# Patient Record
Sex: MALE | Race: WHITE | NOT HISPANIC OR LATINO | URBAN - METROPOLITAN AREA
[De-identification: names, ages, dates, MRNs, and addresses within clinical notes are randomized per-mention and may not be internally consistent; named-entity substitution may affect disease eponyms.]

---

## 2021-07-26 ENCOUNTER — HISTORICAL (OUTPATIENT)
Dept: ADMINISTRATIVE | Facility: HOSPITAL | Age: 41
End: 2021-07-26

## 2024-05-06 ENCOUNTER — HOSPITAL ENCOUNTER (OUTPATIENT)
Facility: HOSPITAL | Age: 44
Discharge: HOME | End: 2024-05-06
Attending: INTERNAL MEDICINE | Admitting: INTERNAL MEDICINE
Payer: MEDICARE

## 2024-05-06 DIAGNOSIS — I87.2 PERIPHERAL VENOUS INSUFFICIENCY: ICD-10-CM

## 2024-05-06 DIAGNOSIS — R07.9 CHEST PAIN: ICD-10-CM

## 2024-05-06 DIAGNOSIS — I87.2 VENOUS INSUFFICIENCY: ICD-10-CM

## 2024-05-06 LAB
OHS QRS DURATION: 112 MS
OHS QTC CALCULATION: 431 MS

## 2024-05-06 PROCEDURE — C1725 CATH, TRANSLUMIN NON-LASER: HCPCS | Performed by: INTERNAL MEDICINE

## 2024-05-06 PROCEDURE — C1887 CATHETER, GUIDING: HCPCS | Performed by: INTERNAL MEDICINE

## 2024-05-06 PROCEDURE — 93005 ELECTROCARDIOGRAM TRACING: CPT | Mod: 59

## 2024-05-06 PROCEDURE — C1753 CATH, INTRAVAS ULTRASOUND: HCPCS | Performed by: INTERNAL MEDICINE

## 2024-05-06 PROCEDURE — 75822 VEIN X-RAY ARMS/LEGS: CPT | Mod: 59 | Performed by: INTERNAL MEDICINE

## 2024-05-06 PROCEDURE — 37252 INTRVASC US NONCORONARY 1ST: CPT | Performed by: INTERNAL MEDICINE

## 2024-05-06 PROCEDURE — 99153 MOD SED SAME PHYS/QHP EA: CPT | Performed by: INTERNAL MEDICINE

## 2024-05-06 PROCEDURE — 37253 INTRVASC US NONCORONARY ADDL: CPT | Performed by: INTERNAL MEDICINE

## 2024-05-06 PROCEDURE — 25000003 PHARM REV CODE 250: Performed by: INTERNAL MEDICINE

## 2024-05-06 PROCEDURE — 63600175 PHARM REV CODE 636 W HCPCS: Performed by: INTERNAL MEDICINE

## 2024-05-06 PROCEDURE — C1760 CLOSURE DEV, VASC: HCPCS | Performed by: INTERNAL MEDICINE

## 2024-05-06 PROCEDURE — 37238 OPEN/PERQ PLACE STENT SAME: CPT | Mod: LT | Performed by: INTERNAL MEDICINE

## 2024-05-06 PROCEDURE — 93010 ELECTROCARDIOGRAM REPORT: CPT | Mod: ,,, | Performed by: INTERNAL MEDICINE

## 2024-05-06 PROCEDURE — 99152 MOD SED SAME PHYS/QHP 5/>YRS: CPT | Performed by: INTERNAL MEDICINE

## 2024-05-06 PROCEDURE — C1769 GUIDE WIRE: HCPCS | Performed by: INTERNAL MEDICINE

## 2024-05-06 PROCEDURE — 25500020 PHARM REV CODE 255: Performed by: INTERNAL MEDICINE

## 2024-05-06 PROCEDURE — C1894 INTRO/SHEATH, NON-LASER: HCPCS | Performed by: INTERNAL MEDICINE

## 2024-05-06 PROCEDURE — 85347 COAGULATION TIME ACTIVATED: CPT | Performed by: INTERNAL MEDICINE

## 2024-05-06 PROCEDURE — C1876 STENT, NON-COA/NON-COV W/DEL: HCPCS | Performed by: INTERNAL MEDICINE

## 2024-05-06 DEVICE — SYS CLSR VASCADE MVP ID6-12FR: Type: IMPLANTABLE DEVICE | Site: ILIAC CREST | Status: FUNCTIONAL

## 2024-05-06 DEVICE — STENT AB9U18120090 ABRE V01
Type: IMPLANTABLE DEVICE | Site: ILIAC CREST | Status: FUNCTIONAL
Brand: ABRE™

## 2024-05-06 RX ORDER — SODIUM CHLORIDE 9 MG/ML
INJECTION, SOLUTION INTRAVENOUS CONTINUOUS
Status: DISCONTINUED | OUTPATIENT
Start: 2024-05-06 | End: 2024-05-06 | Stop reason: HOSPADM

## 2024-05-06 RX ORDER — SILDENAFIL 50 MG/1
50 TABLET, FILM COATED ORAL DAILY PRN
COMMUNITY

## 2024-05-06 RX ORDER — GABAPENTIN 300 MG/1
300 CAPSULE ORAL 3 TIMES DAILY
COMMUNITY
Start: 2024-04-03

## 2024-05-06 RX ORDER — CHOLECALCIFEROL (VITAMIN D3) 25 MCG
1 TABLET ORAL DAILY
COMMUNITY

## 2024-05-06 RX ORDER — CLONAZEPAM 1 MG/1
1 TABLET ORAL DAILY PRN
COMMUNITY
Start: 2024-02-16

## 2024-05-06 RX ORDER — LORATADINE 10 MG/1
10 TABLET ORAL DAILY
COMMUNITY

## 2024-05-06 RX ORDER — DIPHENHYDRAMINE HCL 25 MG
50 CAPSULE ORAL
Status: DISPENSED | OUTPATIENT
Start: 2024-05-06

## 2024-05-06 RX ORDER — ONDANSETRON 4 MG/1
8 TABLET, ORALLY DISINTEGRATING ORAL EVERY 8 HOURS PRN
Status: DISCONTINUED | OUTPATIENT
Start: 2024-05-06 | End: 2024-05-06 | Stop reason: HOSPADM

## 2024-05-06 RX ORDER — HYDRALAZINE HYDROCHLORIDE 20 MG/ML
10 INJECTION INTRAMUSCULAR; INTRAVENOUS EVERY 4 HOURS PRN
Status: DISCONTINUED | OUTPATIENT
Start: 2024-05-06 | End: 2024-05-06 | Stop reason: HOSPADM

## 2024-05-06 RX ORDER — FLUTICASONE PROPIONATE AND SALMETEROL 100; 50 UG/1; UG/1
1 POWDER RESPIRATORY (INHALATION) 2 TIMES DAILY
COMMUNITY

## 2024-05-06 RX ORDER — HYDROCHLOROTHIAZIDE 12.5 MG/1
12.5 TABLET ORAL DAILY PRN
COMMUNITY

## 2024-05-06 RX ORDER — ACETAMINOPHEN 325 MG/1
650 TABLET ORAL EVERY 4 HOURS PRN
Status: DISCONTINUED | OUTPATIENT
Start: 2024-05-06 | End: 2024-05-06 | Stop reason: HOSPADM

## 2024-05-06 RX ORDER — DEXTROAMPHETAMINE SACCHARATE, AMPHETAMINE ASPARTATE, DEXTROAMPHETAMINE SULFATE AND AMPHETAMINE SULFATE 2.5; 2.5; 2.5; 2.5 MG/1; MG/1; MG/1; MG/1
1 TABLET ORAL
COMMUNITY
Start: 2024-03-20

## 2024-05-06 RX ORDER — DEXTROAMPHETAMINE SACCHARATE, AMPHETAMINE ASPARTATE MONOHYDRATE, DEXTROAMPHETAMINE SULFATE AND AMPHETAMINE SULFATE 7.5; 7.5; 7.5; 7.5 MG/1; MG/1; MG/1; MG/1
30 CAPSULE, EXTENDED RELEASE ORAL EVERY MORNING
COMMUNITY
Start: 2024-03-20

## 2024-05-06 RX ORDER — LIDOCAINE HYDROCHLORIDE 10 MG/ML
INJECTION INFILTRATION; PERINEURAL
Status: DISCONTINUED | OUTPATIENT
Start: 2024-05-06 | End: 2024-05-06 | Stop reason: HOSPADM

## 2024-05-06 RX ORDER — MORPHINE SULFATE 4 MG/ML
2 INJECTION, SOLUTION INTRAMUSCULAR; INTRAVENOUS EVERY 4 HOURS PRN
Status: DISCONTINUED | OUTPATIENT
Start: 2024-05-06 | End: 2024-05-06 | Stop reason: HOSPADM

## 2024-05-06 RX ORDER — DIAZEPAM 5 MG/1
10 TABLET ORAL
Status: DISPENSED | OUTPATIENT
Start: 2024-05-06

## 2024-05-06 RX ORDER — ASPIRIN 81 MG/1
81 TABLET ORAL DAILY
Qty: 90 TABLET | Refills: 3 | Status: SHIPPED | OUTPATIENT
Start: 2024-05-06 | End: 2025-05-01

## 2024-05-06 RX ORDER — HYDROCODONE BITARTRATE AND ACETAMINOPHEN 5; 325 MG/1; MG/1
1 TABLET ORAL EVERY 4 HOURS PRN
Status: DISCONTINUED | OUTPATIENT
Start: 2024-05-06 | End: 2024-05-06 | Stop reason: HOSPADM

## 2024-05-06 RX ORDER — ASPIRIN 325 MG
TABLET ORAL
Status: DISCONTINUED | OUTPATIENT
Start: 2024-05-06 | End: 2024-05-06 | Stop reason: HOSPADM

## 2024-05-06 RX ORDER — FENTANYL CITRATE 50 UG/ML
INJECTION, SOLUTION INTRAMUSCULAR; INTRAVENOUS
Status: DISCONTINUED | OUTPATIENT
Start: 2024-05-06 | End: 2024-05-06 | Stop reason: HOSPADM

## 2024-05-06 RX ORDER — ALUMINUM HYDROXIDE, MAGNESIUM HYDROXIDE, AND SIMETHICONE 1200; 120; 1200 MG/30ML; MG/30ML; MG/30ML
30 SUSPENSION ORAL ONCE
Status: DISCONTINUED | OUTPATIENT
Start: 2024-05-06 | End: 2024-05-06 | Stop reason: HOSPADM

## 2024-05-06 RX ORDER — MIDAZOLAM HYDROCHLORIDE 1 MG/ML
INJECTION INTRAMUSCULAR; INTRAVENOUS
Status: DISCONTINUED | OUTPATIENT
Start: 2024-05-06 | End: 2024-05-06 | Stop reason: HOSPADM

## 2024-05-06 RX ORDER — LITHIUM CARBONATE 450 MG/1
900 TABLET ORAL NIGHTLY
COMMUNITY

## 2024-05-06 RX ORDER — ALUMINUM HYDROXIDE, MAGNESIUM HYDROXIDE, AND SIMETHICONE 1200; 120; 1200 MG/30ML; MG/30ML; MG/30ML
SUSPENSION ORAL
Status: DISCONTINUED | OUTPATIENT
Start: 2024-05-06 | End: 2024-05-06 | Stop reason: HOSPADM

## 2024-05-06 RX ORDER — PRAZOSIN HYDROCHLORIDE 1 MG/1
2 CAPSULE ORAL NIGHTLY
COMMUNITY

## 2024-05-06 RX ORDER — CLOPIDOGREL BISULFATE 75 MG/1
75 TABLET ORAL DAILY
Qty: 90 TABLET | Refills: 3 | Status: SHIPPED | OUTPATIENT
Start: 2024-05-06 | End: 2025-05-06

## 2024-05-06 RX ORDER — SODIUM CHLORIDE 9 MG/ML
INJECTION, SOLUTION INTRAVENOUS ONCE
Status: COMPLETED | OUTPATIENT
Start: 2024-05-06 | End: 2024-05-06

## 2024-05-06 RX ORDER — HEPARIN SODIUM 1000 [USP'U]/ML
INJECTION, SOLUTION INTRAVENOUS; SUBCUTANEOUS
Status: DISCONTINUED | OUTPATIENT
Start: 2024-05-06 | End: 2024-05-06 | Stop reason: HOSPADM

## 2024-05-06 RX ORDER — CLOPIDOGREL BISULFATE 300 MG/1
TABLET, FILM COATED ORAL
Status: DISCONTINUED | OUTPATIENT
Start: 2024-05-06 | End: 2024-05-06 | Stop reason: HOSPADM

## 2024-05-06 RX ADMIN — DIAZEPAM 10 MG: 5 TABLET ORAL at 09:05

## 2024-05-06 RX ADMIN — DIPHENHYDRAMINE HYDROCHLORIDE 50 MG: 25 CAPSULE ORAL at 09:05

## 2024-05-06 RX ADMIN — SODIUM CHLORIDE: 9 INJECTION, SOLUTION INTRAVENOUS at 09:05

## 2024-05-06 NOTE — Clinical Note
A venogram was performed of the Bilateral Venogram [place]. The vessel was injected via power injection The venogram syringe was removed and the venogram is complete.

## 2024-05-10 VITALS
SYSTOLIC BLOOD PRESSURE: 142 MMHG | WEIGHT: 232.81 LBS | BODY MASS INDEX: 33.33 KG/M2 | HEART RATE: 69 BPM | DIASTOLIC BLOOD PRESSURE: 81 MMHG | TEMPERATURE: 97 F | HEIGHT: 70 IN | OXYGEN SATURATION: 98 %

## 2024-10-11 ENCOUNTER — HOSPITAL ENCOUNTER (INPATIENT)
Facility: HOSPITAL | Age: 44
LOS: 5 days | Discharge: HOME | DRG: 493 | End: 2024-10-17
Attending: STUDENT IN AN ORGANIZED HEALTH CARE EDUCATION/TRAINING PROGRAM | Admitting: SURGERY
Payer: COMMERCIAL

## 2024-10-11 DIAGNOSIS — Q73.8 REDUCTION DEFECT OF EXTREMITY: ICD-10-CM
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
## 2024-10-11 DIAGNOSIS — T14.90XA TRAUMA: ICD-10-CM

## 2024-10-11 DIAGNOSIS — Z01.818 PRE-OP EVALUATION: ICD-10-CM

## 2024-10-11 DIAGNOSIS — S02.2XXA CLOSED FRACTURE OF NASAL BONE, INITIAL ENCOUNTER: ICD-10-CM

## 2024-10-11 DIAGNOSIS — S82.891A CLOSED FRACTURE OF RIGHT ANKLE, INITIAL ENCOUNTER: ICD-10-CM

## 2024-10-11 DIAGNOSIS — S52.502A CLOSED FRACTURE OF DISTAL END OF LEFT RADIUS, UNSPECIFIED FRACTURE MORPHOLOGY, INITIAL ENCOUNTER: Primary | ICD-10-CM

## 2024-10-11 DIAGNOSIS — S01.81XA FACIAL LACERATION, INITIAL ENCOUNTER: ICD-10-CM

## 2024-10-11 DIAGNOSIS — S62.102A CLOSED FRACTURE OF LEFT WRIST, INITIAL ENCOUNTER: ICD-10-CM

## 2024-10-11 LAB
ABORH RETYPE: NORMAL
ALBUMIN SERPL-MCNC: 4 G/DL (ref 3.5–5)
ALBUMIN/GLOB SERPL: 1.6 RATIO (ref 1.1–2)
ALP SERPL-CCNC: 78 UNIT/L (ref 40–150)
ALT SERPL-CCNC: 15 UNIT/L (ref 0–55)
AMPHET UR QL SCN: POSITIVE
ANION GAP SERPL CALC-SCNC: 6 MEQ/L
APTT PPP: 24.1 SECONDS (ref 23.2–33.7)
AST SERPL-CCNC: 18 UNIT/L (ref 5–34)
BACTERIA #/AREA URNS AUTO: ABNORMAL /HPF
BARBITURATE SCN PRESENT UR: NEGATIVE
BASOPHILS # BLD AUTO: 0.06 X10(3)/MCL
BASOPHILS NFR BLD AUTO: 0.4 %
BENZODIAZ UR QL SCN: NEGATIVE
BILIRUB SERPL-MCNC: 0.3 MG/DL
BILIRUB UR QL STRIP.AUTO: NEGATIVE
BUN SERPL-MCNC: 12.5 MG/DL (ref 8.9–20.6)
CALCIUM SERPL-MCNC: 9.6 MG/DL (ref 8.4–10.2)
CANNABINOIDS UR QL SCN: POSITIVE
CHLORIDE SERPL-SCNC: 107 MMOL/L (ref 98–107)
CLARITY UR: CLEAR
CO2 SERPL-SCNC: 24 MMOL/L (ref 22–29)
COCAINE UR QL SCN: NEGATIVE
COLOR UR AUTO: ABNORMAL
CREAT SERPL-MCNC: 1.13 MG/DL (ref 0.73–1.18)
CREAT/UREA NIT SERPL: 11
EOSINOPHIL # BLD AUTO: 0.02 X10(3)/MCL (ref 0–0.9)
EOSINOPHIL NFR BLD AUTO: 0.1 %
ERYTHROCYTE [DISTWIDTH] IN BLOOD BY AUTOMATED COUNT: 13.4 % (ref 11.5–17)
ETHANOL SERPL-MCNC: <10 MG/DL
FENTANYL UR QL SCN: POSITIVE
GFR SERPLBLD CREATININE-BSD FMLA CKD-EPI: >60 ML/MIN/1.73/M2
GLOBULIN SER-MCNC: 2.5 GM/DL (ref 2.4–3.5)
GLUCOSE SERPL-MCNC: 124 MG/DL (ref 74–100)
GLUCOSE UR QL STRIP: NORMAL
GROUP & RH: NORMAL
HCT VFR BLD AUTO: 46.2 % (ref 42–52)
HGB BLD-MCNC: 14.9 G/DL (ref 14–18)
HGB UR QL STRIP: NEGATIVE
IMM GRANULOCYTES # BLD AUTO: 0.22 X10(3)/MCL (ref 0–0.04)
IMM GRANULOCYTES NFR BLD AUTO: 1.3 %
INDIRECT COOMBS: NORMAL
INR PPP: 0.9
KETONES UR QL STRIP: NEGATIVE
LACTATE SERPL-SCNC: 1.9 MMOL/L (ref 0.5–2.2)
LEUKOCYTE ESTERASE UR QL STRIP: NEGATIVE
LYMPHOCYTES # BLD AUTO: 1.32 X10(3)/MCL (ref 0.6–4.6)
LYMPHOCYTES NFR BLD AUTO: 7.7 %
MCH RBC QN AUTO: 28.5 PG (ref 27–31)
MCHC RBC AUTO-ENTMCNC: 32.3 G/DL (ref 33–36)
MCV RBC AUTO: 88.3 FL (ref 80–94)
MDMA UR QL SCN: NEGATIVE
MONOCYTES # BLD AUTO: 0.85 X10(3)/MCL (ref 0.1–1.3)
MONOCYTES NFR BLD AUTO: 5 %
MUCOUS THREADS URNS QL MICRO: ABNORMAL /LPF
NEUTROPHILS # BLD AUTO: 14.66 X10(3)/MCL (ref 2.1–9.2)
NEUTROPHILS NFR BLD AUTO: 85.5 %
NITRITE UR QL STRIP: NEGATIVE
NRBC BLD AUTO-RTO: 0 %
OHS QRS DURATION: 110 MS
OHS QTC CALCULATION: 447 MS
OPIATES UR QL SCN: POSITIVE
PCP UR QL: NEGATIVE
PH UR STRIP: 6 [PH]
PH UR: 6 [PH] (ref 3–11)
PLATELET # BLD AUTO: 342 X10(3)/MCL (ref 130–400)
PMV BLD AUTO: 10.4 FL (ref 7.4–10.4)
POTASSIUM SERPL-SCNC: 4.6 MMOL/L (ref 3.5–5.1)
PROT SERPL-MCNC: 6.5 GM/DL (ref 6.4–8.3)
PROT UR QL STRIP: NEGATIVE
PROTHROMBIN TIME: 12.2 SECONDS (ref 12.5–14.5)
RBC # BLD AUTO: 5.23 X10(6)/MCL (ref 4.7–6.1)
RBC #/AREA URNS AUTO: ABNORMAL /HPF
SODIUM SERPL-SCNC: 137 MMOL/L (ref 136–145)
SP GR UR STRIP.AUTO: 1.04 (ref 1–1.03)
SPECIFIC GRAVITY, URINE AUTO (.000) (OHS): 1.04 (ref 1–1.03)
SPECIMEN OUTDATE: NORMAL
SQUAMOUS #/AREA URNS LPF: ABNORMAL /HPF
UROBILINOGEN UR STRIP-ACNC: NORMAL
WBC # BLD AUTO: 17.13 X10(3)/MCL (ref 4.5–11.5)
WBC #/AREA URNS AUTO: ABNORMAL /HPF

## 2024-10-11 PROCEDURE — 0PSJ35Z REPOSITION LEFT RADIUS WITH EXTERNAL FIXATION DEVICE, PERCUTANEOUS APPROACH: ICD-10-PCS | Performed by: STUDENT IN AN ORGANIZED HEALTH CARE EDUCATION/TRAINING PROGRAM

## 2024-10-11 PROCEDURE — 96372 THER/PROPH/DIAG INJ SC/IM: CPT

## 2024-10-11 PROCEDURE — 63600175 PHARM REV CODE 636 W HCPCS

## 2024-10-11 PROCEDURE — 99152 MOD SED SAME PHYS/QHP 5/>YRS: CPT

## 2024-10-11 PROCEDURE — 96374 THER/PROPH/DIAG INJ IV PUSH: CPT

## 2024-10-11 PROCEDURE — 25000003 PHARM REV CODE 250

## 2024-10-11 PROCEDURE — 86901 BLOOD TYPING SEROLOGIC RH(D): CPT | Performed by: STUDENT IN AN ORGANIZED HEALTH CARE EDUCATION/TRAINING PROGRAM

## 2024-10-11 PROCEDURE — 27000221 HC OXYGEN, UP TO 24 HOURS

## 2024-10-11 PROCEDURE — 82077 ASSAY SPEC XCP UR&BREATH IA: CPT | Performed by: STUDENT IN AN ORGANIZED HEALTH CARE EDUCATION/TRAINING PROGRAM

## 2024-10-11 PROCEDURE — 29105 APPLICATION LONG ARM SPLINT: CPT | Mod: LT

## 2024-10-11 PROCEDURE — 85610 PROTHROMBIN TIME: CPT | Performed by: STUDENT IN AN ORGANIZED HEALTH CARE EDUCATION/TRAINING PROGRAM

## 2024-10-11 PROCEDURE — 83605 ASSAY OF LACTIC ACID: CPT | Performed by: STUDENT IN AN ORGANIZED HEALTH CARE EDUCATION/TRAINING PROGRAM

## 2024-10-11 PROCEDURE — 85025 COMPLETE CBC W/AUTO DIFF WBC: CPT | Performed by: STUDENT IN AN ORGANIZED HEALTH CARE EDUCATION/TRAINING PROGRAM

## 2024-10-11 PROCEDURE — 85730 THROMBOPLASTIN TIME PARTIAL: CPT | Performed by: STUDENT IN AN ORGANIZED HEALTH CARE EDUCATION/TRAINING PROGRAM

## 2024-10-11 PROCEDURE — 93010 ELECTROCARDIOGRAM REPORT: CPT | Mod: ,,, | Performed by: INTERNAL MEDICINE

## 2024-10-11 PROCEDURE — 25000242 PHARM REV CODE 250 ALT 637 W/ HCPCS

## 2024-10-11 PROCEDURE — 99222 1ST HOSP IP/OBS MODERATE 55: CPT | Mod: 57,,, | Performed by: ORTHOPAEDIC SURGERY

## 2024-10-11 PROCEDURE — G0378 HOSPITAL OBSERVATION PER HR: HCPCS

## 2024-10-11 PROCEDURE — 80307 DRUG TEST PRSMV CHEM ANLYZR: CPT | Performed by: STUDENT IN AN ORGANIZED HEALTH CARE EDUCATION/TRAINING PROGRAM

## 2024-10-11 PROCEDURE — 29515 APPLICATION SHORT LEG SPLINT: CPT | Mod: RT

## 2024-10-11 PROCEDURE — 96375 TX/PRO/DX INJ NEW DRUG ADDON: CPT

## 2024-10-11 PROCEDURE — 93005 ELECTROCARDIOGRAM TRACING: CPT

## 2024-10-11 PROCEDURE — 80053 COMPREHEN METABOLIC PANEL: CPT | Performed by: STUDENT IN AN ORGANIZED HEALTH CARE EDUCATION/TRAINING PROGRAM

## 2024-10-11 PROCEDURE — 81003 URINALYSIS AUTO W/O SCOPE: CPT | Performed by: STUDENT IN AN ORGANIZED HEALTH CARE EDUCATION/TRAINING PROGRAM

## 2024-10-11 PROCEDURE — S4991 NICOTINE PATCH NONLEGEND: HCPCS

## 2024-10-11 PROCEDURE — 86900 BLOOD TYPING SEROLOGIC ABO: CPT | Performed by: STUDENT IN AN ORGANIZED HEALTH CARE EDUCATION/TRAINING PROGRAM

## 2024-10-11 PROCEDURE — 25605 CLTX DST RDL FX/EPHYS SEP W/: CPT

## 2024-10-11 PROCEDURE — 63600175 PHARM REV CODE 636 W HCPCS: Performed by: SURGERY

## 2024-10-11 PROCEDURE — 81001 URINALYSIS AUTO W/SCOPE: CPT | Mod: XB | Performed by: STUDENT IN AN ORGANIZED HEALTH CARE EDUCATION/TRAINING PROGRAM

## 2024-10-11 PROCEDURE — 86850 RBC ANTIBODY SCREEN: CPT | Performed by: STUDENT IN AN ORGANIZED HEALTH CARE EDUCATION/TRAINING PROGRAM

## 2024-10-11 PROCEDURE — 0HQ1XZZ REPAIR FACE SKIN, EXTERNAL APPROACH: ICD-10-PCS | Performed by: SURGERY

## 2024-10-11 PROCEDURE — 99285 EMERGENCY DEPT VISIT HI MDM: CPT | Mod: 25

## 2024-10-11 PROCEDURE — 25500020 PHARM REV CODE 255: Performed by: STUDENT IN AN ORGANIZED HEALTH CARE EDUCATION/TRAINING PROGRAM

## 2024-10-11 PROCEDURE — 63600175 PHARM REV CODE 636 W HCPCS: Performed by: STUDENT IN AN ORGANIZED HEALTH CARE EDUCATION/TRAINING PROGRAM

## 2024-10-11 PROCEDURE — 25000003 PHARM REV CODE 250: Performed by: STUDENT IN AN ORGANIZED HEALTH CARE EDUCATION/TRAINING PROGRAM

## 2024-10-11 PROCEDURE — 99223 1ST HOSP IP/OBS HIGH 75: CPT | Mod: AI,,, | Performed by: SURGERY

## 2024-10-11 PROCEDURE — G0390 TRAUMA RESPONS W/HOSP CRITI: HCPCS | Performed by: STUDENT IN AN ORGANIZED HEALTH CARE EDUCATION/TRAINING PROGRAM

## 2024-10-11 RX ORDER — GABAPENTIN 300 MG/1
300 CAPSULE ORAL 3 TIMES DAILY
Status: DISCONTINUED | OUTPATIENT
Start: 2024-10-11 | End: 2024-10-11

## 2024-10-11 RX ORDER — KETAMINE HCL IN 0.9 % NACL 50 MG/5 ML
SYRINGE (ML) INTRAVENOUS
Status: DISPENSED
Start: 2024-10-11 | End: 2024-10-12

## 2024-10-11 RX ORDER — ADHESIVE BANDAGE
30 BANDAGE TOPICAL DAILY PRN
Status: DISCONTINUED | OUTPATIENT
Start: 2024-10-11 | End: 2024-10-17 | Stop reason: HOSPADM

## 2024-10-11 RX ORDER — FLUTICASONE FUROATE AND VILANTEROL 100; 25 UG/1; UG/1
1 POWDER RESPIRATORY (INHALATION) DAILY
Status: DISCONTINUED | OUTPATIENT
Start: 2024-10-12 | End: 2024-10-17 | Stop reason: HOSPADM

## 2024-10-11 RX ORDER — SODIUM CHLORIDE 9 MG/ML
INJECTION, SOLUTION INTRAVENOUS
Status: COMPLETED | OUTPATIENT
Start: 2024-10-11 | End: 2024-10-11

## 2024-10-11 RX ORDER — METHOCARBAMOL 500 MG/1
500 TABLET, FILM COATED ORAL EVERY 8 HOURS
Status: DISCONTINUED | OUTPATIENT
Start: 2024-10-11 | End: 2024-10-16

## 2024-10-11 RX ORDER — CEFAZOLIN SODIUM 2 G/50ML
SOLUTION INTRAVENOUS
Status: COMPLETED | OUTPATIENT
Start: 2024-10-11 | End: 2024-10-11

## 2024-10-11 RX ORDER — LITHIUM CARBONATE 450 MG/1
900 TABLET ORAL NIGHTLY
Status: DISCONTINUED | OUTPATIENT
Start: 2024-10-12 | End: 2024-10-17 | Stop reason: HOSPADM

## 2024-10-11 RX ORDER — PRAZOSIN HYDROCHLORIDE 2 MG/1
2 CAPSULE ORAL NIGHTLY
Status: DISCONTINUED | OUTPATIENT
Start: 2024-10-11 | End: 2024-10-11

## 2024-10-11 RX ORDER — PROPOFOL 10 MG/ML
VIAL (ML) INTRAVENOUS
Status: DISPENSED
Start: 2024-10-11 | End: 2024-10-12

## 2024-10-11 RX ORDER — CLONAZEPAM 1 MG/1
1 TABLET ORAL DAILY PRN
Status: DISCONTINUED | OUTPATIENT
Start: 2024-10-11 | End: 2024-10-17 | Stop reason: HOSPADM

## 2024-10-11 RX ORDER — OXYCODONE HYDROCHLORIDE 5 MG/1
10 TABLET ORAL EVERY 4 HOURS PRN
Status: DISCONTINUED | OUTPATIENT
Start: 2024-10-11 | End: 2024-10-16

## 2024-10-11 RX ORDER — LIDOCAINE HYDROCHLORIDE 10 MG/ML
5 INJECTION, SOLUTION INFILTRATION; PERINEURAL
Status: COMPLETED | OUTPATIENT
Start: 2024-10-11 | End: 2024-10-11

## 2024-10-11 RX ORDER — POLYETHYLENE GLYCOL 3350 17 G/17G
17 POWDER, FOR SOLUTION ORAL 2 TIMES DAILY
Status: DISCONTINUED | OUTPATIENT
Start: 2024-10-11 | End: 2024-10-17 | Stop reason: HOSPADM

## 2024-10-11 RX ORDER — IBUPROFEN 200 MG
1 TABLET ORAL DAILY
Status: DISCONTINUED | OUTPATIENT
Start: 2024-10-12 | End: 2024-10-11

## 2024-10-11 RX ORDER — SODIUM CHLORIDE, SODIUM LACTATE, POTASSIUM CHLORIDE, CALCIUM CHLORIDE 600; 310; 30; 20 MG/100ML; MG/100ML; MG/100ML; MG/100ML
INJECTION, SOLUTION INTRAVENOUS CONTINUOUS
Status: DISCONTINUED | OUTPATIENT
Start: 2024-10-11 | End: 2024-10-13

## 2024-10-11 RX ORDER — ACETAMINOPHEN 325 MG/1
650 TABLET ORAL EVERY 4 HOURS
Status: DISPENSED | OUTPATIENT
Start: 2024-10-11 | End: 2024-10-16

## 2024-10-11 RX ORDER — LITHIUM CARBONATE 450 MG/1
450 TABLET ORAL DAILY
Status: DISCONTINUED | OUTPATIENT
Start: 2024-10-12 | End: 2024-10-17 | Stop reason: HOSPADM

## 2024-10-11 RX ORDER — FENTANYL CITRATE 50 UG/ML
INJECTION, SOLUTION INTRAMUSCULAR; INTRAVENOUS CODE/TRAUMA/SEDATION MEDICATION
Status: COMPLETED | OUTPATIENT
Start: 2024-10-11 | End: 2024-10-11

## 2024-10-11 RX ORDER — IBUPROFEN 200 MG
1 TABLET ORAL DAILY
Status: DISCONTINUED | OUTPATIENT
Start: 2024-10-11 | End: 2024-10-17 | Stop reason: HOSPADM

## 2024-10-11 RX ORDER — CEFAZOLIN SODIUM 1 G/3ML
INJECTION, POWDER, FOR SOLUTION INTRAMUSCULAR; INTRAVENOUS
Status: DISPENSED
Start: 2024-10-11 | End: 2024-10-12

## 2024-10-11 RX ORDER — DOCUSATE SODIUM 100 MG/1
100 CAPSULE, LIQUID FILLED ORAL 2 TIMES DAILY
Status: DISCONTINUED | OUTPATIENT
Start: 2024-10-11 | End: 2024-10-16

## 2024-10-11 RX ORDER — PRAZOSIN HYDROCHLORIDE 2 MG/1
4 CAPSULE ORAL NIGHTLY
Status: DISCONTINUED | OUTPATIENT
Start: 2024-10-11 | End: 2024-10-17 | Stop reason: HOSPADM

## 2024-10-11 RX ORDER — KETAMINE HYDROCHLORIDE 10 MG/ML
INJECTION, SOLUTION INTRAMUSCULAR; INTRAVENOUS CODE/TRAUMA/SEDATION MEDICATION
Status: COMPLETED | OUTPATIENT
Start: 2024-10-11 | End: 2024-10-11

## 2024-10-11 RX ORDER — ONDANSETRON HYDROCHLORIDE 2 MG/ML
4 INJECTION, SOLUTION INTRAVENOUS
Status: COMPLETED | OUTPATIENT
Start: 2024-10-11 | End: 2024-10-11

## 2024-10-11 RX ORDER — FENTANYL CITRATE 50 UG/ML
INJECTION, SOLUTION INTRAMUSCULAR; INTRAVENOUS
Status: DISPENSED
Start: 2024-10-11 | End: 2024-10-12

## 2024-10-11 RX ORDER — ALBUTEROL SULFATE 90 UG/1
1 INHALANT RESPIRATORY (INHALATION) EVERY 6 HOURS PRN
Status: DISCONTINUED | OUTPATIENT
Start: 2024-10-11 | End: 2024-10-17 | Stop reason: HOSPADM

## 2024-10-11 RX ORDER — ASPIRIN 81 MG/1
81 TABLET ORAL DAILY
Status: DISCONTINUED | OUTPATIENT
Start: 2024-10-12 | End: 2024-10-17 | Stop reason: HOSPADM

## 2024-10-11 RX ORDER — OXYCODONE HYDROCHLORIDE 5 MG/1
5 TABLET ORAL EVERY 4 HOURS PRN
Status: DISCONTINUED | OUTPATIENT
Start: 2024-10-11 | End: 2024-10-17

## 2024-10-11 RX ORDER — TALC
6 POWDER (GRAM) TOPICAL NIGHTLY PRN
Status: DISCONTINUED | OUTPATIENT
Start: 2024-10-11 | End: 2024-10-17 | Stop reason: HOSPADM

## 2024-10-11 RX ORDER — BACITRACIN 500 [USP'U]/G
OINTMENT TOPICAL 3 TIMES DAILY
Status: DISCONTINUED | OUTPATIENT
Start: 2024-10-11 | End: 2024-10-17 | Stop reason: HOSPADM

## 2024-10-11 RX ORDER — MORPHINE SULFATE 4 MG/ML
4 INJECTION, SOLUTION INTRAMUSCULAR; INTRAVENOUS
Status: COMPLETED | OUTPATIENT
Start: 2024-10-11 | End: 2024-10-11

## 2024-10-11 RX ORDER — PROPOFOL 10 MG/ML
VIAL (ML) INTRAVENOUS CODE/TRAUMA/SEDATION MEDICATION
Status: COMPLETED | OUTPATIENT
Start: 2024-10-11 | End: 2024-10-11

## 2024-10-11 RX ORDER — ENOXAPARIN SODIUM 100 MG/ML
40 INJECTION SUBCUTANEOUS EVERY 12 HOURS
Status: DISCONTINUED | OUTPATIENT
Start: 2024-10-11 | End: 2024-10-14

## 2024-10-11 RX ORDER — LIDOCAINE HYDROCHLORIDE 10 MG/ML
INJECTION, SOLUTION INFILTRATION; PERINEURAL
Status: DISPENSED
Start: 2024-10-11 | End: 2024-10-12

## 2024-10-11 RX ADMIN — ACETAMINOPHEN 325MG 650 MG: 325 TABLET ORAL at 10:10

## 2024-10-11 RX ADMIN — ALBUTEROL SULFATE 1 PUFF: 90 AEROSOL, METERED RESPIRATORY (INHALATION) at 10:10

## 2024-10-11 RX ADMIN — KETAMINE HYDROCHLORIDE 10 MG: 10 INJECTION INTRAMUSCULAR; INTRAVENOUS at 01:10

## 2024-10-11 RX ADMIN — ACETAMINOPHEN 325MG 650 MG: 325 TABLET ORAL at 06:10

## 2024-10-11 RX ADMIN — SODIUM CHLORIDE, POTASSIUM CHLORIDE, SODIUM LACTATE AND CALCIUM CHLORIDE: 600; 310; 30; 20 INJECTION, SOLUTION INTRAVENOUS at 04:10

## 2024-10-11 RX ADMIN — PROPOFOL 30 MG: 10 INJECTION, EMULSION INTRAVENOUS at 01:10

## 2024-10-11 RX ADMIN — ONDANSETRON 4 MG: 2 INJECTION INTRAMUSCULAR; INTRAVENOUS at 02:10

## 2024-10-11 RX ADMIN — OXYCODONE HYDROCHLORIDE 10 MG: 5 TABLET ORAL at 04:10

## 2024-10-11 RX ADMIN — FENTANYL CITRATE 100 MCG: 50 INJECTION, SOLUTION INTRAMUSCULAR; INTRAVENOUS at 01:10

## 2024-10-11 RX ADMIN — POLYETHYLENE GLYCOL 3350 17 GRAM ORAL POWDER PACKET 17 G: at 08:10

## 2024-10-11 RX ADMIN — PRAZOSIN HYDROCHLORIDE 4 MG: 2 CAPSULE ORAL at 10:10

## 2024-10-11 RX ADMIN — ENOXAPARIN SODIUM 40 MG: 40 INJECTION SUBCUTANEOUS at 08:10

## 2024-10-11 RX ADMIN — BACITRACIN: 500 OINTMENT TOPICAL at 10:10

## 2024-10-11 RX ADMIN — METHOCARBAMOL 500 MG: 500 TABLET ORAL at 08:10

## 2024-10-11 RX ADMIN — NICOTINE 1 PATCH: 14 PATCH TRANSDERMAL at 08:10

## 2024-10-11 RX ADMIN — IOHEXOL 100 ML: 350 INJECTION, SOLUTION INTRAVENOUS at 01:10

## 2024-10-11 RX ADMIN — PROPOFOL 60 MG: 10 INJECTION, EMULSION INTRAVENOUS at 01:10

## 2024-10-11 RX ADMIN — OXYCODONE HYDROCHLORIDE 10 MG: 5 TABLET ORAL at 08:10

## 2024-10-11 RX ADMIN — MORPHINE SULFATE 4 MG: 4 INJECTION, SOLUTION INTRAMUSCULAR; INTRAVENOUS at 02:10

## 2024-10-11 RX ADMIN — CEFAZOLIN SODIUM 2 G: 2 SOLUTION INTRAVENOUS at 01:10

## 2024-10-11 RX ADMIN — Medication 6 MG: at 08:10

## 2024-10-11 RX ADMIN — ACETAMINOPHEN 325MG 650 MG: 325 TABLET ORAL at 02:10

## 2024-10-11 RX ADMIN — SODIUM CHLORIDE 500 ML/HR: 9 INJECTION, SOLUTION INTRAVENOUS at 01:10

## 2024-10-11 RX ADMIN — LIDOCAINE HYDROCHLORIDE 5 ML: 10 INJECTION, SOLUTION INFILTRATION; PERINEURAL at 02:10

## 2024-10-11 RX ADMIN — DOCUSATE SODIUM 100 MG: 100 CAPSULE, LIQUID FILLED ORAL at 08:10

## 2024-10-11 RX ADMIN — METHOCARBAMOL 500 MG: 500 TABLET ORAL at 02:10

## 2024-10-11 RX ADMIN — GABAPENTIN 300 MG: 300 CAPSULE ORAL at 02:10
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
## 2024-10-11 NOTE — PLAN OF CARE
Problem: Fall Injury Risk  Goal: Absence of Fall and Fall-Related Injury  Reactivated     Problem: Orthopaedic Fracture  Goal: Absence of Bleeding  Reactivated  Goal: Bowel Elimination  Reactivated  Goal: Absence of Embolism Signs and Symptoms  Reactivated  Goal: Fracture Stability  Reactivated  Goal: Optimal Functional Ability  Reactivated  Goal: Absence of Infection Signs and Symptoms  Reactivated  Goal: Effective Tissue Perfusion  Reactivated  Goal: Optimal Pain Control and Function  Reactivated  Goal: Effective Oxygenation and Ventilation  Reactivated

## 2024-10-11 NOTE — PROCEDURES
Laceration Repair Procedure    Patient Name: Edward Wilks  MRN: [id number]  YOB: 1980  Admit Date: 10/11/2024  #0  Date of Procedure: 10/11/2024    Procedure: Wound washout and laceration repair.  Location: Right eyebrow  Laceration dimensions: 5 cm  Anesthesia: 1% plain lidocaine    Procedure in Detail:   The wound was prepped and draped in the sterile fashion. 10 cc of lidocaine 1% without epinephrine was then used to anaesthetized the skin edges of the laceration and the deep tissue of the wound cavity. Utilizing a clean technique, the wound was carefully explored for any abnormalities including signs of infection and foreign bodies. The wound cavity was copiously irrigated with sterile saline mixed with betadine to remove any remaining debris. The wound edges were then reapproximated using 5-0 fast absorbing plain gut in a simple interrupted fashion. The wound was then dressed with gauze and tape. The procedure was then complete - good closure and hemostasis. The patient tolerated the procedure well without complications.     Number of sutures: 7    Follow up:  All sutures used for this procedure are absorbable and do not require follow up for removal.   Keep incisions clean and dry. Apply bacitracin over the wound.     Post procedure care was discussed with the nurse, patient, and family.      Jayme Ballard MD  [facility] General Surgery PGY-1  Trauma Surgery

## 2024-10-11 NOTE — Clinical Note
Diagnosis: Trauma [[id number]]   Future Attending Provider: CORY JENNINGS [[id number]]   Admit to which facility:: [facility] [[id number]]   Special Needs:: No Special Needs [1]
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
## 2024-10-12 ENCOUNTER — ANESTHESIA (OUTPATIENT)
Dept: SURGERY | Facility: HOSPITAL | Age: 44
End: 2024-10-12
Payer: MEDICARE

## 2024-10-12 ENCOUNTER — ANESTHESIA EVENT (OUTPATIENT)
Dept: SURGERY | Facility: HOSPITAL | Age: 44
End: 2024-10-12
Payer: MEDICARE

## 2024-10-12 PROBLEM — S82.891A CLOSED RIGHT ANKLE FRACTURE: Status: ACTIVE | Noted: 2024-10-12

## 2024-10-12 PROBLEM — S82.54XD: Status: ACTIVE | Noted: 2024-10-12

## 2024-10-12 LAB
ALBUMIN SERPL-MCNC: 3.3 G/DL (ref 3.5–5)
ALBUMIN/GLOB SERPL: 1.4 RATIO (ref 1.1–2)
ALP SERPL-CCNC: 73 UNIT/L (ref 40–150)
ALT SERPL-CCNC: 12 UNIT/L (ref 0–55)
ANION GAP SERPL CALC-SCNC: 7 MEQ/L
AST SERPL-CCNC: 17 UNIT/L (ref 5–34)
BASOPHILS # BLD AUTO: 0.04 X10(3)/MCL
BASOPHILS NFR BLD AUTO: 0.3 %
BILIRUB SERPL-MCNC: 0.7 MG/DL
BUN SERPL-MCNC: 12.1 MG/DL (ref 8.9–20.6)
CALCIUM SERPL-MCNC: 8.6 MG/DL (ref 8.4–10.2)
CHLORIDE SERPL-SCNC: 103 MMOL/L (ref 98–107)
CO2 SERPL-SCNC: 26 MMOL/L (ref 22–29)
CREAT SERPL-MCNC: 1.01 MG/DL (ref 0.73–1.18)
CREAT/UREA NIT SERPL: 12
EOSINOPHIL # BLD AUTO: 0.14 X10(3)/MCL (ref 0–0.9)
EOSINOPHIL NFR BLD AUTO: 0.9 %
ERYTHROCYTE [DISTWIDTH] IN BLOOD BY AUTOMATED COUNT: 13.3 % (ref 11.5–17)
GFR SERPLBLD CREATININE-BSD FMLA CKD-EPI: >60 ML/MIN/1.73/M2
GLOBULIN SER-MCNC: 2.3 GM/DL (ref 2.4–3.5)
GLUCOSE SERPL-MCNC: 100 MG/DL (ref 74–100)
GROUP & RH: NORMAL
HCT VFR BLD AUTO: 39.6 % (ref 42–52)
HGB BLD-MCNC: 13 G/DL (ref 14–18)
IMM GRANULOCYTES # BLD AUTO: 0.18 X10(3)/MCL (ref 0–0.04)
IMM GRANULOCYTES NFR BLD AUTO: 1.1 %
INDIRECT COOMBS: NORMAL
LYMPHOCYTES # BLD AUTO: 2.54 X10(3)/MCL (ref 0.6–4.6)
LYMPHOCYTES NFR BLD AUTO: 15.9 %
MAGNESIUM SERPL-MCNC: 2.1 MG/DL (ref 1.6–2.6)
MCH RBC QN AUTO: 28.8 PG (ref 27–31)
MCHC RBC AUTO-ENTMCNC: 32.8 G/DL (ref 33–36)
MCV RBC AUTO: 87.6 FL (ref 80–94)
MONOCYTES # BLD AUTO: 1.88 X10(3)/MCL (ref 0.1–1.3)
MONOCYTES NFR BLD AUTO: 11.8 %
NEUTROPHILS # BLD AUTO: 11.16 X10(3)/MCL (ref 2.1–9.2)
NEUTROPHILS NFR BLD AUTO: 70 %
NRBC BLD AUTO-RTO: 0 %
PHOSPHATE SERPL-MCNC: 4.4 MG/DL (ref 2.3–4.7)
PLATELET # BLD AUTO: 270 X10(3)/MCL (ref 130–400)
PMV BLD AUTO: 10.2 FL (ref 7.4–10.4)
POTASSIUM SERPL-SCNC: 3.6 MMOL/L (ref 3.5–5.1)
PROT SERPL-MCNC: 5.6 GM/DL (ref 6.4–8.3)
RBC # BLD AUTO: 4.52 X10(6)/MCL (ref 4.7–6.1)
SODIUM SERPL-SCNC: 136 MMOL/L (ref 136–145)
SPECIMEN OUTDATE: NORMAL
WBC # BLD AUTO: 15.94 X10(3)/MCL (ref 4.5–11.5)

## 2024-10-12 PROCEDURE — 36000710: Performed by: ORTHOPAEDIC SURGERY

## 2024-10-12 PROCEDURE — 86850 RBC ANTIBODY SCREEN: CPT | Performed by: SURGERY

## 2024-10-12 PROCEDURE — 27201423 OPTIME MED/SURG SUP & DEVICES STERILE SUPPLY: Performed by: ORTHOPAEDIC SURGERY

## 2024-10-12 PROCEDURE — 63600175 PHARM REV CODE 636 W HCPCS

## 2024-10-12 PROCEDURE — 36415 COLL VENOUS BLD VENIPUNCTURE: CPT

## 2024-10-12 PROCEDURE — 99233 SBSQ HOSP IP/OBS HIGH 50: CPT | Mod: ,,, | Performed by: SURGERY

## 2024-10-12 PROCEDURE — 25608 OPTX DST RD XART FX/EPI SEP2: CPT | Mod: LT,,, | Performed by: ORTHOPAEDIC SURGERY

## 2024-10-12 PROCEDURE — 25000003 PHARM REV CODE 250: Performed by: NURSE ANESTHETIST, CERTIFIED REGISTERED

## 2024-10-12 PROCEDURE — 37000008 HC ANESTHESIA 1ST 15 MINUTES: Performed by: ORTHOPAEDIC SURGERY

## 2024-10-12 PROCEDURE — 37000009 HC ANESTHESIA EA ADD 15 MINS: Performed by: ORTHOPAEDIC SURGERY

## 2024-10-12 PROCEDURE — S4991 NICOTINE PATCH NONLEGEND: HCPCS

## 2024-10-12 PROCEDURE — 86900 BLOOD TYPING SEROLOGIC ABO: CPT | Performed by: SURGERY

## 2024-10-12 PROCEDURE — 63600175 PHARM REV CODE 636 W HCPCS: Performed by: ORTHOPAEDIC SURGERY

## 2024-10-12 PROCEDURE — 80053 COMPREHEN METABOLIC PANEL: CPT

## 2024-10-12 PROCEDURE — 0QSG04Z REPOSITION RIGHT TIBIA WITH INTERNAL FIXATION DEVICE, OPEN APPROACH: ICD-10-PCS | Performed by: ORTHOPAEDIC SURGERY

## 2024-10-12 PROCEDURE — 27000221 HC OXYGEN, UP TO 24 HOURS

## 2024-10-12 PROCEDURE — 63600175 PHARM REV CODE 636 W HCPCS: Performed by: ANESTHESIOLOGY

## 2024-10-12 PROCEDURE — 11000001 HC ACUTE MED/SURG PRIVATE ROOM

## 2024-10-12 PROCEDURE — 25608 OPTX DST RD XART FX/EPI SEP2: CPT | Mod: AS,LT,, | Performed by: PHYSICIAN ASSISTANT

## 2024-10-12 PROCEDURE — 64415 NJX AA&/STRD BRCH PLXS IMG: CPT | Performed by: ANESTHESIOLOGY

## 2024-10-12 PROCEDURE — 25000003 PHARM REV CODE 250

## 2024-10-12 PROCEDURE — C1713 ANCHOR/SCREW BN/BN,TIS/BN: HCPCS | Performed by: ORTHOPAEDIC SURGERY

## 2024-10-12 PROCEDURE — 83735 ASSAY OF MAGNESIUM: CPT

## 2024-10-12 PROCEDURE — D9220A PRA ANESTHESIA: Mod: ANES,,, | Performed by: ANESTHESIOLOGY

## 2024-10-12 PROCEDURE — 71000033 HC RECOVERY, INTIAL HOUR: Performed by: ORTHOPAEDIC SURGERY

## 2024-10-12 PROCEDURE — 63600175 PHARM REV CODE 636 W HCPCS: Performed by: NURSE ANESTHETIST, CERTIFIED REGISTERED

## 2024-10-12 PROCEDURE — D9220A PRA ANESTHESIA: Mod: CRNA,,, | Performed by: NURSE ANESTHETIST, CERTIFIED REGISTERED

## 2024-10-12 PROCEDURE — 36000711: Performed by: ORTHOPAEDIC SURGERY

## 2024-10-12 PROCEDURE — 27766 OPTX MEDIAL ANKLE FX: CPT | Mod: 51,RT,, | Performed by: ORTHOPAEDIC SURGERY

## 2024-10-12 PROCEDURE — 86901 BLOOD TYPING SEROLOGIC RH(D): CPT | Performed by: SURGERY

## 2024-10-12 PROCEDURE — 85025 COMPLETE CBC W/AUTO DIFF WBC: CPT

## 2024-10-12 PROCEDURE — 84100 ASSAY OF PHOSPHORUS: CPT

## 2024-10-12 PROCEDURE — 0PSJ04Z REPOSITION LEFT RADIUS WITH INTERNAL FIXATION DEVICE, OPEN APPROACH: ICD-10-PCS | Performed by: ORTHOPAEDIC SURGERY

## 2024-10-12 PROCEDURE — 63600175 PHARM REV CODE 636 W HCPCS: Performed by: SURGERY

## 2024-10-12 PROCEDURE — 36415 COLL VENOUS BLD VENIPUNCTURE: CPT | Mod: 91 | Performed by: SURGERY

## 2024-10-12 DEVICE — PEG FIX GEMINUS N LOK 18X2.3MM: Type: IMPLANTABLE DEVICE | Site: RADIUS | Status: FUNCTIONAL

## 2024-10-12 DEVICE — PEG FIX GEMINUS THRED 22X2.3MM: Type: IMPLANTABLE DEVICE | Site: RADIUS | Status: FUNCTIONAL

## 2024-10-12 DEVICE — PEG GEMINUS HI COMPR 2.7X18MM: Type: IMPLANTABLE DEVICE | Site: RADIUS | Status: FUNCTIONAL

## 2024-10-12 DEVICE — SCREW BONE CORT 3.5X16MM: Type: IMPLANTABLE DEVICE | Site: RADIUS | Status: FUNCTIONAL

## 2024-10-12 DEVICE — IMPLANTABLE DEVICE: Type: IMPLANTABLE DEVICE | Site: RADIUS | Status: FUNCTIONAL

## 2024-10-12 RX ORDER — DEXAMETHASONE SODIUM PHOSPHATE 4 MG/ML
INJECTION, SOLUTION INTRA-ARTICULAR; INTRALESIONAL; INTRAMUSCULAR; INTRAVENOUS; SOFT TISSUE
Status: DISCONTINUED | OUTPATIENT
Start: 2024-10-12 | End: 2024-10-12

## 2024-10-12 RX ORDER — GLYCOPYRROLATE 0.2 MG/ML
INJECTION INTRAMUSCULAR; INTRAVENOUS
Status: DISCONTINUED | OUTPATIENT
Start: 2024-10-12 | End: 2024-10-12

## 2024-10-12 RX ORDER — PROPOFOL 10 MG/ML
VIAL (ML) INTRAVENOUS
Status: DISCONTINUED | OUTPATIENT
Start: 2024-10-12 | End: 2024-10-12

## 2024-10-12 RX ORDER — SODIUM CHLORIDE 0.9 % (FLUSH) 0.9 %
10 SYRINGE (ML) INJECTION
Status: DISCONTINUED | OUTPATIENT
Start: 2024-10-12 | End: 2024-10-12 | Stop reason: HOSPADM

## 2024-10-12 RX ORDER — VANCOMYCIN HYDROCHLORIDE 1 G/20ML
INJECTION, POWDER, LYOPHILIZED, FOR SOLUTION INTRAVENOUS
Status: DISCONTINUED | OUTPATIENT
Start: 2024-10-12 | End: 2024-10-12 | Stop reason: HOSPADM

## 2024-10-12 RX ORDER — ONDANSETRON HYDROCHLORIDE 2 MG/ML
INJECTION, SOLUTION INTRAVENOUS
Status: DISCONTINUED | OUTPATIENT
Start: 2024-10-12 | End: 2024-10-12

## 2024-10-12 RX ORDER — CEFAZOLIN SODIUM 1 G/3ML
INJECTION, POWDER, FOR SOLUTION INTRAMUSCULAR; INTRAVENOUS
Status: DISCONTINUED | OUTPATIENT
Start: 2024-10-12 | End: 2024-10-12

## 2024-10-12 RX ORDER — ROPIVACAINE HYDROCHLORIDE 5 MG/ML
INJECTION, SOLUTION EPIDURAL; INFILTRATION; PERINEURAL
Status: COMPLETED | OUTPATIENT
Start: 2024-10-12 | End: 2024-10-12

## 2024-10-12 RX ORDER — CEFAZOLIN SODIUM 2 G/50ML
2 SOLUTION INTRAVENOUS
Status: COMPLETED | OUTPATIENT
Start: 2024-10-12 | End: 2024-10-13

## 2024-10-12 RX ORDER — HYDROMORPHONE HYDROCHLORIDE 2 MG/ML
0.4 INJECTION, SOLUTION INTRAMUSCULAR; INTRAVENOUS; SUBCUTANEOUS EVERY 5 MIN PRN
Status: DISCONTINUED | OUTPATIENT
Start: 2024-10-12 | End: 2024-10-12 | Stop reason: HOSPADM

## 2024-10-12 RX ORDER — FAMOTIDINE 10 MG/ML
20 INJECTION INTRAVENOUS ONCE
Status: DISCONTINUED | OUTPATIENT
Start: 2024-10-12 | End: 2024-10-12 | Stop reason: HOSPADM

## 2024-10-12 RX ORDER — ROPIVACAINE HYDROCHLORIDE 2 MG/ML
20 INJECTION, SOLUTION EPIDURAL; INFILTRATION ONCE
Status: DISCONTINUED | OUTPATIENT
Start: 2024-10-12 | End: 2024-10-17 | Stop reason: HOSPADM

## 2024-10-12 RX ORDER — FENTANYL CITRATE 50 UG/ML
50 INJECTION, SOLUTION INTRAMUSCULAR; INTRAVENOUS EVERY 5 MIN PRN
Status: DISPENSED | OUTPATIENT
Start: 2024-10-12 | End: 2024-10-12

## 2024-10-12 RX ORDER — GLUCAGON 1 MG
1 KIT INJECTION
Status: DISCONTINUED | OUTPATIENT
Start: 2024-10-12 | End: 2024-10-12 | Stop reason: HOSPADM

## 2024-10-12 RX ORDER — CEFAZOLIN SODIUM 2 G/50ML
2 SOLUTION INTRAVENOUS
Status: DISCONTINUED | OUTPATIENT
Start: 2024-10-12 | End: 2024-10-12

## 2024-10-12 RX ORDER — LIDOCAINE HYDROCHLORIDE 20 MG/ML
INJECTION, SOLUTION EPIDURAL; INFILTRATION; INTRACAUDAL; PERINEURAL
Status: DISCONTINUED | OUTPATIENT
Start: 2024-10-12 | End: 2024-10-12

## 2024-10-12 RX ORDER — FENTANYL CITRATE 50 UG/ML
INJECTION, SOLUTION INTRAMUSCULAR; INTRAVENOUS
Status: DISCONTINUED | OUTPATIENT
Start: 2024-10-12 | End: 2024-10-12

## 2024-10-12 RX ORDER — ROCURONIUM BROMIDE 10 MG/ML
INJECTION, SOLUTION INTRAVENOUS
Status: DISCONTINUED | OUTPATIENT
Start: 2024-10-12 | End: 2024-10-12

## 2024-10-12 RX ORDER — LIDOCAINE HYDROCHLORIDE 10 MG/ML
1 INJECTION, SOLUTION EPIDURAL; INFILTRATION; INTRACAUDAL; PERINEURAL ONCE
Status: DISCONTINUED | OUTPATIENT
Start: 2024-10-12 | End: 2024-10-12 | Stop reason: HOSPADM

## 2024-10-12 RX ORDER — CEFAZOLIN SODIUM 2 G/50ML
2 SOLUTION INTRAVENOUS ONCE
Status: DISCONTINUED | OUTPATIENT
Start: 2024-10-12 | End: 2024-10-12 | Stop reason: HOSPADM

## 2024-10-12 RX ADMIN — FENTANYL CITRATE 50 MCG: 50 INJECTION, SOLUTION INTRAMUSCULAR; INTRAVENOUS at 08:10

## 2024-10-12 RX ADMIN — ACETAMINOPHEN 325MG 650 MG: 325 TABLET ORAL at 02:10

## 2024-10-12 RX ADMIN — OXYCODONE HYDROCHLORIDE 5 MG: 5 TABLET ORAL at 07:10

## 2024-10-12 RX ADMIN — ACETAMINOPHEN 325MG 650 MG: 325 TABLET ORAL at 05:10

## 2024-10-12 RX ADMIN — GLYCOPYRROLATE 0.2 MG: 0.2 INJECTION INTRAMUSCULAR; INTRAVENOUS at 09:10

## 2024-10-12 RX ADMIN — OXYCODONE HYDROCHLORIDE 10 MG: 5 TABLET ORAL at 08:10

## 2024-10-12 RX ADMIN — ROPIVACAINE HYDROCHLORIDE 15 ML: 5 INJECTION, SOLUTION EPIDURAL; INFILTRATION; PERINEURAL at 09:10

## 2024-10-12 RX ADMIN — OXYCODONE HYDROCHLORIDE 10 MG: 5 TABLET ORAL at 02:10

## 2024-10-12 RX ADMIN — DEXAMETHASONE SODIUM PHOSPHATE 8 MG: 4 INJECTION, SOLUTION INTRA-ARTICULAR; INTRALESIONAL; INTRAMUSCULAR; INTRAVENOUS; SOFT TISSUE at 09:10

## 2024-10-12 RX ADMIN — NICOTINE 1 PATCH: 14 PATCH TRANSDERMAL at 08:10

## 2024-10-12 RX ADMIN — METHOCARBAMOL 500 MG: 500 TABLET ORAL at 05:10

## 2024-10-12 RX ADMIN — BACITRACIN: 500 OINTMENT TOPICAL at 05:10

## 2024-10-12 RX ADMIN — ALBUTEROL SULFATE 1 PUFF: 90 AEROSOL, METERED RESPIRATORY (INHALATION) at 09:10

## 2024-10-12 RX ADMIN — METHOCARBAMOL 500 MG: 500 TABLET ORAL at 09:10

## 2024-10-12 RX ADMIN — DOCUSATE SODIUM 100 MG: 100 CAPSULE, LIQUID FILLED ORAL at 08:10

## 2024-10-12 RX ADMIN — SUGAMMADEX 200 MG: 100 INJECTION, SOLUTION INTRAVENOUS at 10:10

## 2024-10-12 RX ADMIN — ENOXAPARIN SODIUM 40 MG: 40 INJECTION SUBCUTANEOUS at 08:10

## 2024-10-12 RX ADMIN — CEFAZOLIN 2 G: 330 INJECTION, POWDER, FOR SOLUTION INTRAMUSCULAR; INTRAVENOUS at 09:10

## 2024-10-12 RX ADMIN — FENTANYL CITRATE 100 MCG: 50 INJECTION, SOLUTION INTRAMUSCULAR; INTRAVENOUS at 09:10

## 2024-10-12 RX ADMIN — FENTANYL CITRATE 50 MCG: 50 INJECTION, SOLUTION INTRAMUSCULAR; INTRAVENOUS at 10:10

## 2024-10-12 RX ADMIN — LITHIUM CARBONATE 900 MG: 450 TABLET, EXTENDED RELEASE ORAL at 08:10

## 2024-10-12 RX ADMIN — ONDANSETRON 4 MG: 2 INJECTION INTRAMUSCULAR; INTRAVENOUS at 10:10

## 2024-10-12 RX ADMIN — PROPOFOL 200 MG: 10 INJECTION, EMULSION INTRAVENOUS at 09:10

## 2024-10-12 RX ADMIN — METHOCARBAMOL 500 MG: 500 TABLET ORAL at 02:10

## 2024-10-12 RX ADMIN — FENTANYL CITRATE 25 MCG: 50 INJECTION, SOLUTION INTRAMUSCULAR; INTRAVENOUS at 10:10

## 2024-10-12 RX ADMIN — FENTANYL CITRATE 50 MCG: 50 INJECTION, SOLUTION INTRAMUSCULAR; INTRAVENOUS at 09:10

## 2024-10-12 RX ADMIN — SODIUM CHLORIDE, POTASSIUM CHLORIDE, SODIUM LACTATE AND CALCIUM CHLORIDE: 600; 310; 30; 20 INJECTION, SOLUTION INTRAVENOUS at 02:10

## 2024-10-12 RX ADMIN — SODIUM CHLORIDE: 9 INJECTION, SOLUTION INTRAVENOUS at 09:10

## 2024-10-12 RX ADMIN — BACITRACIN: 500 OINTMENT TOPICAL at 09:10

## 2024-10-12 RX ADMIN — LIDOCAINE HYDROCHLORIDE 80 MG: 20 INJECTION, SOLUTION INTRAVENOUS at 09:10

## 2024-10-12 RX ADMIN — CEFAZOLIN SODIUM 2 G: 2 SOLUTION INTRAVENOUS at 05:10

## 2024-10-12 RX ADMIN — PRAZOSIN HYDROCHLORIDE 4 MG: 2 CAPSULE ORAL at 08:10

## 2024-10-12 RX ADMIN — ROCURONIUM BROMIDE 70 MG: 10 SOLUTION INTRAVENOUS at 09:10

## 2024-10-12 NOTE — PLAN OF CARE
Problem: Adult Inpatient Plan of Care  Goal: Plan of Care Review  Outcome: Progressing  Goal: Patient-Specific Goal (Individualized)  Outcome: Progressing  Goal: Absence of Hospital-Acquired Illness or Injury  Outcome: Progressing  Goal: Optimal Comfort and Wellbeing  Outcome: Progressing  Intervention: Monitor Pain and Promote Comfort  Flowsheets (Taken 10/12/2024 0227)  Pain Management Interventions:   medication offered   quiet environment facilitated   care clustered  Goal: Readiness for Transition of Care  Outcome: Progressing     Problem: Fall Injury Risk  Goal: Absence of Fall and Fall-Related Injury  Outcome: Progressing  Intervention: Identify and Manage Contributors  Flowsheets (Taken 10/12/2024 0227)  Medication Review/Management: medications reviewed     Problem: Orthopaedic Fracture  Goal: Absence of Bleeding  Outcome: Progressing  Goal: Bowel Elimination  Outcome: Progressing  Goal: Absence of Embolism Signs and Symptoms  Outcome: Progressing  Intervention: Prevent or Manage Embolism Risk  Flowsheets (Taken 10/12/2024 0227)  VTE Prevention/Management: prepared for procedure/surgery  Goal: Fracture Stability  Outcome: Progressing  Goal: Optimal Functional Ability  Outcome: Progressing  Goal: Absence of Infection Signs and Symptoms  Outcome: Progressing  Goal: Effective Tissue Perfusion  Outcome: Progressing  Intervention: Prevent or Manage Neurovascular Compromise  Flowsheets (Taken 10/12/2024 0227)  Compartment Syndrome Management:   extremity placed at heart level   active flexion/extension encouraged  Goal: Optimal Pain Control and Function  Outcome: Progressing  Goal: Effective Oxygenation and Ventilation  Outcome: Progressing

## 2024-10-12 NOTE — OP NOTE
OPERATIVE REPORT    Patient: Edward Wilks   : 1980    MRN: [id number]  Date: 10/12/2024      Surgeon:Jonathan Barrientos DO  Assistant: Jarvis Fang was essential, part of the procedure including deep hardware placement and deep closure.  No senior assistant was availible  Preoperative Diagnosis:Left intra-articular distal radius fracture , right medial malleolus fracture  Postoperative Diagnosis: Same  Procedure:   1) Open reduction and internal fixation of  Left 2 part intra-articular distal radius fracture  2) open reduction internal fixation right medial malleolus fracture  Anesthesiologist: Winifred  OR Staff: Circulator: Claudia Sahni RN  Radiology Technologist: Yany Lopez RT  Scrub Person: Umu Ayala ST  Implants:   Implant Name Type Inv. Item Serial No.  Lot No. LRB No. Used Action   GEMINUS VDR PLATE- Narrow 120 mm LEFT      Left 1 Implanted   PEG FIX GEMINUS N SHU 18X2.3MM - [id number]  PEG FIX GEMINUS N SHU 18X2.3MM  [employer]  Left 1 Implanted   threaded peg locking 2.3mm x 20 mm TI      Left 2 Implanted   PEG GEMINUS THRD SHU 2.3X21MM - [id number]  PEG GEMINUS THRD SHU 2.3X21MM  [employer]  Left 1 Implanted   PEG FIX GEMINUS THRED 22X2.3MM - [id number]  PEG FIX GEMINUS THRED 22X2.3MM  [employer]  Left 1 Implanted   PEG GEMINUS HI COMPR 2.7X18MM - [id number]  PEG GEMINUS HI COMPR 2.7X18MM  [employer]  Left 1 Implanted   SCREW BONE KOJO 3.5X16MM - [id number]  SCREW BONE KOJO 3.5X16MM  [employer]  Left 3 Implanted   k-wire standard tip 1.6mm x 127mm      Left 3 Implanted and Explanted   5.5mm cannulated screw system      Left 1 Implanted   4.0mm cannulated system      Left 1 Implanted   4.0mm cannulated screw system      Left 1 Implanted   1.4mm guidewire L 100 mm trocar tip      Left 3 Implanted and Explanted   4.0mm cannulated screw system      Left 1 Implanted     EBL:  50cc  Complications: None  Disposition: To PACU, stable    Indications: Edward Wilks is a 44 y.o. male presenting with the aforementioned injuries. The procedure is indicated to obtain and maintain stability of the wrist and allow early ROM.  The patient is awake and alert. After thorough discussion of the risks, benefits, expected outcomes, and alternatives to surgical intervention, the patient agreed to proceed with surgical treatment.  Specific risks discussed included, but were not limited to: superficial or deep infection, wound healing complications, DVT/PE, significant bleeding requiring transfusion, damage to named anatomic structures in the immediate area including named neurovascular structures, carpal tunnel syndrome, implant failure, and general risks of anesthesia.  The patient voiced understanding and written as well as verbal consent was obtained by myself prior to the procedure.    Procedure Note:  The patient was brought back to the OR and placed supine on the OR table. After successful induction of anesthesia by anesthesia staff, the patient was positioned in the supine position and all bony prominences were padded appropriately.  The surgical field was then provisionally cleansed and then prepped and draped in the usual sterile fashion.    At this time a time-out was performed, with the correct patient, site, and procedure identified.  The universal time out as well as sign your site protocols were followed.  Preoperative antibiotics were verified as administered.     A standard volar FCR-based approach was utilized and attention to hemostasis was paid using electrocautery.  The FCR sheath was incised and the FCR retracted ulnarly.  The FCR subsheath was incised and the FPL swept ulnarly.  The pronator quadratus was seen and elevated radially and distally.  The fracture site was easily identified and the fracture was reduced.  The brachioradialis was not tenotomized at the distal radius  to allow improved reduction of the fracture.  We did not pin the radius provisionally.      The plate was fixed distally first, and the proximal aspect was then fixed to bone after removal of the proximal peg.  Final C-arm images were obtained and saved to the [employer] system.  The 2 part intra-articular fracture was reduced and held nicely.  No additional implants were needed for stabilization of the distal radius.    Attention is now drawn to the right ankle.  Patient has a nondisplaced medial malleolus fracture.  I made a 2 cm incision over the medial malleolus we kept the fracture reduced completed open reduction internal fixation with 3 headed Synthes screws.  A 5.5 mm, 4.0 x 2    Satisfied length alignment rotation we then proceeded to close both incisions    The incisions were then irrigated using copious sterile saline and then closed in layered fashion.  The surgical sites were infiltrated using local anesthetic, and the arm was sterilely cleansed and dressed.    The patient was then subsequently extubated and transferred to to PACU in a stable condition.    All sponge and needle counts were correct at the end of the case.  I was present and participated in all aspects of the procedure.    Prognosis:  We will keep the patient NWB/ROMAT LUE 8 weeks, NWB RLE boot for 8 weeks.  The patient can do ROM of the fingers and elbow as tolerated.        This note/OR report was created with the assistance of  voice recognition software or phone  dictation.  There may be transcription errors as a result of using this technology however minimal. Effort has been made to assure accuracy of transcription but any obvious errors or omissions should be clarified with the author of the document.       Jonathan Barrientos,   Orthopedic Trauma Surgery

## 2024-10-12 NOTE — ANESTHESIA PROCEDURE NOTES
Intubation    Date/Time: 10/12/2024 9:31 AM    Performed by: Maria Luisa Dickens CRNA  Authorized by: Quinton Lane DO    Intubation:     Induction:  Rapid sequence induction    Intubated:  Postinduction    Mask Ventilation:  Not attempted    Attempts:  1    Attempted By:  CRNA    Method of Intubation:  Video laryngoscopy    Blade:  Diallo 3    Laryngeal View Grade: Grade I - full view of cords      Difficult Airway Encountered?: No      Complications:  None    Airway Device:  Oral endotracheal tube    Airway Device Size:  7.5    Style/Cuff Inflation:  Cuffed (inflated to minimal occlusive pressure)    Tube secured:  23    Secured at:  The lips    Placement Verified By:  Capnometry    Complicating Factors:  Obesity and poor neck/head extension    Findings Post-Intubation:  BS equal bilateral and atraumatic/condition of teeth unchanged

## 2024-10-12 NOTE — ANESTHESIA PROCEDURE NOTES
Peripheral Block    Patient location during procedure: pre-op   Block not for primary anesthetic.  Reason for block: at surgeon's request and post-op pain management   Post-op Pain Location: left wrist   Start time: 10/12/2024 8:58 AM  Timeout: 10/12/2024 8:58 AM   End time: 10/12/2024 9:11 AM    Staffing  Authorizing Provider: Quinton Lane DO  Performing Provider: Quinton Lane DO    Staffing  Performed by: Quinton Lane DO  Authorized by: Quinton Lane DO    Preanesthetic Checklist  Completed: patient identified, IV checked, site marked, risks and benefits discussed, surgical consent, monitors and equipment checked, pre-op evaluation and timeout performed  Peripheral Block  Patient position: supine  Prep: ChloraPrep  Patient monitoring: heart rate, cardiac monitor, continuous pulse ox and frequent blood pressure checks  Block type: supraclavicular  Laterality: left  Injection technique: single shot  Needle  Needle type: Stimuplex   Needle gauge: 22 G  Needle length: 2 in  Needle localization: anatomical landmarks and ultrasound guidance   -ultrasound image captured on disc.  Assessment  Injection assessment: negative aspiration, negative parasthesia and local visualized surrounding nerve  Paresthesia pain: none  Heart rate change: no  Slow fractionated injection: yes  Pain Tolerance: comfortable throughout block and no complaints  Medications:    Medications: ropivacaine (NAROPIN) injection 0.5% - Perineural   15 mL - 10/12/2024 9:10:00 AM    Additional Notes  VSS.  [facility] RN monitoring vitals throughout procedure.  Patient tolerated procedure well.

## 2024-10-12 NOTE — ANESTHESIA POSTPROCEDURE EVALUATION
Anesthesia Post Evaluation    Patient: Edward Wilks    Procedure(s) Performed: Procedure(s) (LRB):  ORIF, FRACTURE, RADIUS, DISTAL (Left)  ORIF, ANKLE (Right)    Final Anesthesia Type: general      Patient location during evaluation: PACU  Patient participation: Yes- Able to Participate  Level of consciousness: awake and alert  Post-procedure vital signs: reviewed and stable  Pain management: adequate  Airway patency: patent    PONV status at discharge: No PONV  Anesthetic complications: no      Cardiovascular status: blood pressure returned to baseline  Respiratory status: unassisted and spontaneous ventilation  Hydration status: euvolemic  Follow-up needed               Vitals Value Taken Time   /91 10/12/24 1140   Temp 36.6 °C (97.9 °F) 10/12/24 1140   Pulse 82 10/12/24 1143   Resp 16 10/12/24 1143   SpO2 95 % 10/12/24 1143         Event Time   Out of Recovery 10/12/2024 11:43:00         Pain/Vicky Score: Presence of Pain: complains of pain/discomfort (10/11/2024 12:55 PM)  Pain Rating Prior to Med Admin: 0 (10/12/2024  8:58 AM)  Pain Rating Post Med Admin: 5 (10/12/2024  8:31 AM)  Vicky Score: 8 (10/12/2024 11:43 AM)

## 2024-10-12 NOTE — TRANSFER OF CARE
Anesthesia Transfer of Care Note    Patient: Edward Wilks    Procedure(s) Performed: Procedure(s) (LRB):  ORIF, FRACTURE, RADIUS, DISTAL (Left)  ORIF, ANKLE (Right)    Patient location: PACU    Anesthesia Type: general    Transport from OR: Transported from OR on room air with adequate spontaneous ventilation    Post pain: adequate analgesia    Post assessment: no apparent anesthetic complications and tolerated procedure well    Post vital signs: stable    Level of consciousness: sedated and responds to stimulation    Nausea/Vomiting: no nausea/vomiting    Complications: none    Transfer of care protocol was followed

## 2024-10-12 NOTE — ANESTHESIA PREPROCEDURE EVALUATION
10/12/2024  Edward Wilks is a 44 y.o., male.      Pre-op Assessment    I have reviewed the Patient Summary Reports.     I have reviewed the Nursing Notes. I have reviewed the NPO Status.   I have reviewed the Medications.     Review of Systems  Anesthesia Hx:  No problems with previous Anesthesia                Social:  Smoker       Cardiovascular:      Denies Hypertension.   Denies MI.        Denies Angina.  Denies CHF.                                 Pulmonary:   COPD  Denies Asthma.                    Hepatic/GI:     GERD, well controlled Denies Liver Disease.  Denies Hepatitis.           Neurological:    Denies CVA.    Seizures, well controlled    Hx TBI    No seizure in past 6 years         Seizure Disorder                          Endocrine:  Denies Diabetes. Denies Hypothyroidism.  Denies Hyperthyroidism.       Denies Obesity / BMI > 30  Psych:  Psychiatric History   PTSD               Physical Exam  General: Well nourished, Cooperative, Alert and Oriented    Airway:  Mallampati: I   Mouth Opening: Normal  TM Distance: Normal  Tongue: Normal  Neck ROM: Normal ROM    Dental:        Anesthesia Plan  Type of Anesthesia, risks & benefits discussed:    Anesthesia Type: Gen ETT  Intra-op Monitoring Plan: Standard ASA Monitors  Induction:  IV  Airway Plan: Video  Informed Consent: Informed consent signed with the Patient and all parties understand the risks and agree with anesthesia plan.  All questions answered.   ASA Score: 3  Day of Surgery Review of History & Physical: H&P Update referred to the surgeon/provider.    Ready For Surgery From Anesthesia Perspective.     .

## 2024-10-12 NOTE — PROGRESS NOTES
Patient has a severely comminuted left intra-articular distal radius fracture high-energy type fracture.  Painless has a nondisplaced right medial malleolus fractures.  We will plan for open reduction internal fixation.   Plan today is for open reduction internal fixation with a bridge plate versus volar fixation.  Patient understands risks and benefits of the procedure.  He understands that his stiffness of the left wrist is common and he may have painful range of motion and complications from this.  He was agreeable with surgery at this time agrees with surgery as stated below.    I explained that surgery and the nature of their condition are not without risks. These include, but are not limited to, bleeding, infection, neurovascular compromise, malunion, nonunion, hardware complications, wound complications, scarring, cosmetic defects, need for later and/or repeated surgeries, avascular necrosis, bone death due to initial trauma, pain, loss of ROM, loss of function, PTOA, deformity, stance/gait and/or functional abnormalities, thromboembolic complications, compartment syndrome, loss of limb, loss of life, anesthetic complications, and other imponderables. I explained that these can occur despite the adequacy of treatments rendered, and that their risks are heightened given the nature of their condition.  I have also discussed the importance not using nicotine products due to the increased risk of infection surgical wound healing complications and nonunion of the fracture.  They verbalized understanding.  No guarantees were made.  They would like to continue with surgery at this time. If appropriate family was involved with surgical discussion.       This note/OR report was created with the assistance of  voice recognition software or phone  dictation.  There may be transcription errors as a result of using this technology however minimal. Effort has been made to assure accuracy of transcription but any obvious  errors or omissions should be clarified with the author of the document.       Jonathan Barrientos, DO  Orthopedic Trauma Surgery

## 2024-10-12 NOTE — PROGRESS NOTES
Trauma Surgery   Progress Note  Patient Name: Edward Wilks                   : 1980     MRN: [id number]   Date of Admission: 10/11/2024  Code Status: Full Code  Date of Exam: 10/12/2024  HD#0  POD#* No surgery found *  Attending Provider: Booker Ramos MD    Subjective:   Interval history:  AF, HDS  Complaining of some burning to the left shoulder  NPO since MN for OR today    Home Meds:   Current Outpatient Medications   Medication Instructions    ALBUTEROL INHL 90 mcg, See admin instructions    aspirin (ECOTRIN) 81 mg, Oral, Daily    clonazePAM (KLONOPIN) 1 mg, Daily PRN    clopidogreL (PLAVIX) 75 mg, Oral, Daily    dextroamphetamine-amphetamine (ADDERALL XR) 30 MG 24 hr capsule 30 mg, Every morning    dextroamphetamine-amphetamine 10 mg Tab 1 tablet, After lunch    fluticasone-salmeterol diskus inhaler 100-50 mcg 1 puff, 2 times daily    gabapentin (NEURONTIN) 300 mg, 3 times daily    hydroCHLOROthiazide (HYDRODIURIL) 12.5 mg, Daily PRN    lithium (ESKALITH) 900 mg, Nightly    loratadine (CLARITIN) 10 mg, Daily    prazosin (MINIPRESS) 2 mg, Nightly    sildenafiL (VIAGRA) 50 mg, Oral, Daily PRN    vitamin D (VITAMIN D3) 1000 units Tab 1 tablet, Daily      Scheduled Meds:   acetaminophen  650 mg Oral Q4H    aspirin  81 mg Oral Daily    bacitracin   Topical (Top) TID    docusate sodium  100 mg Oral BID    enoxparin  40 mg Subcutaneous Q12H    fluticasone furoate-vilanteroL  1 puff Inhalation Daily    lithium  450 mg Oral Daily    lithium  900 mg Oral QHS    methocarbamoL  500 mg Oral Q8H    nicotine  1 patch Transdermal Daily    polyethylene glycol  17 g Oral BID    prazosin  4 mg Oral QHS     Continuous Infusions:   lactated ringers   Intravenous Continuous 100 mL/hr at 10/12/24 0259 New Bag at 10/12/24 0259     PRN Meds:  Current Facility-Administered Medications:     albuterol, 1 puff, Inhalation, Q6H PRN    clonazePAM, 1 mg, Oral, Daily PRN    magnesium hydroxide 400 mg/5 ml, 30 mL, Oral,  "Daily PRN    melatonin, 6 mg, Oral, Nightly PRN    oxyCODONE, 10 mg, Oral, Q4H PRN    oxyCODONE, 5 mg, Oral, Q4H PRN     Objective:     VITAL SIGNS: 24 HR MIN & MAX LAST   Temp  Min: 98 °F (36.7 °C)  Max: 98.5 °F (36.9 °C)  98 °F (36.7 °C)   BP  Min: 121/74  Max: 175/116  121/74    Pulse  Min: 70  Max: 90  70    Resp  Min: 16  Max: 22  20    SpO2  Min: 20 %  Max: 98 %  (!) 94 %      HT: 5' 10" (177.8 cm)  WT: 105.2 kg (232 lb)  BMI: 33.3     Intake/output:  Intake/Output - Last 3 Shifts         10/10 0700  10/11 0659 10/11 0700  10/12 0659    P.O.  240    Total Intake(mL/kg)  240 (2.3)    Urine (mL/kg/hr)  650    Stool  0    Total Output  650    Net  -410          Stool Occurrence  0 x            Intake/Output Summary (Last 24 hours) at 10/12/2024 0645  Last data filed at 10/12/2024 0639  Gross per 24 hour   Intake 240 ml   Output 650 ml   Net -410 ml         Lines/drains/airway:       Peripheral IV - Single Lumen 10/11/24 1836 20 G Anterior;Right Forearm (Active)   Site Assessment Clean;Dry;Intact;No redness;No swelling;No warmth;No drainage 10/12/24 0600   Extremity Assessment Distal to IV No abnormal discoloration;No redness;No swelling;No warmth 10/12/24 0600   Line Status Infusing 10/12/24 0600   Dressing Status Clean;Dry;Intact 10/12/24 0600   Dressing Intervention Integrity maintained 10/12/24 0600   Number of days: 0       Physical examination:  Gen: NAD, AAOx3, answering questions appropriately  HEENT: Right eyebrow laceration with sutures, scattered abrasions  CV: RR  Resp: NWOB  Abd: S/NT/ND  Msk: moving all extremities spontaneously and purposefully, LUE in splint, RLE in splint  Neuro: CN II-XII grossly intact  Skin/wounds: warm, dry, scattered abrasions to extremities    Labs:  Renal:  Recent Labs     10/11/24  1303 10/12/24  0315   BUN 12.5 12.1   CREATININE 1.13 1.01     No results for input(s): "LACTIC" in the last 72 hours.  FEN/GI:  Recent Labs     10/11/24  1303 10/12/24  0315    136   K " "4.6 3.6    103   CO2 24 26   CALCIUM 9.6 8.6   MG  --  2.10   PHOS  --  4.4   ALBUMIN 4.0 3.3*   BILITOT 0.3 0.7   AST 18 17   ALKPHOS 78 73   ALT 15 12     Heme:  Recent Labs     10/11/24  1303 10/12/24  0315   HGB 14.9 13.0*   HCT 46.2 39.6*    270   INR 0.9  --      ID:  Recent Labs     10/11/24  1303 10/12/24  0315   WBC 17.13* 15.94*     CBG:  Recent Labs     10/11/24  1303 10/12/24  0315   GLUCOSE 124* 100      No results for input(s): "POCTGLUCOSE" in the last 72 hours.   Cardiovascular:  No results for input(s): "TROPONINI", "CKTOTAL", "CKMB", "BNP" in the last 168 hours.  I have reviewed all pertinent lab results within the past 24 hours.    Imaging:  CT 3D Rendering WO Independent Workstation   Final Result      X-Ray Knee Complete 4 or More Views Left   Final Result      No acute osseous abnormality.         Electronically signed by: Sina Morrow   Date:    10/11/2024   Time:    14:40      X-Ray Wrist 2 View Left   Final Result      Improved alignment of the distal radial fracture following reduction and splinting.         Electronically signed by: Argenis Sahni   Date:    10/11/2024   Time:    14:42      X-Ray Ankle Complete Right   Final Result      Nondisplaced fracture of the medial malleolus.         Electronically signed by: Argenis Sahni   Date:    10/11/2024   Time:    14:40      CT Chest Abdomen Pelvis With IV Contrast (XPD) NO Oral Contrast   Final Result      No posttraumatic abnormality of the chest abdomen and pelvis.         Electronically signed by: Hugo Spicer MD   Date:    10/11/2024   Time:    13:57      CT Cervical Spine Without Contrast   Final Result      No appreciable acute osseous abnormality by CT evaluation         Electronically signed by: Ingris Figueroa   Date:    10/11/2024   Time:    13:59      CT Head Without Contrast   Final Result      Fracture of the nasal bone and nasal ridge      Soft tissue swelling in the forehead on the right       "   Electronically signed by: Andrew Soria   Date:    10/11/2024   Time:    14:01      X-Ray Wrist Complete Left   Final Result      Displaced and angulated fracture of the distal radius      Ulnar styloid fracture         Electronically signed by: Ingris Figueroa   Date:    10/11/2024   Time:    13:57      X-Ray Pelvis Routine AP   Final Result      No acute osseous abnormality.         Electronically signed by: Ingris Figueroa   Date:    10/11/2024   Time:    13:56      X-Ray Chest 1 View   Final Result      Increase interstitial markings.      Otherwise no active pulmonary disease         Electronically signed by: Sina Morrow   Date:    10/11/2024   Time:    13:27         I have reviewed all pertinent imaging results/findings within the past 24 hours.    Micro/Path/Other:  Microbiology Results (last 7 days)       ** No results found for the last 168 hours. **           Pathology Results  (Last 7 days)      None             Problems list:  Active Problem List with Overview Notes    Diagnosis Date Noted    Closed fracture of left distal radius 10/11/2024      Active Diagnoses:    Diagnosis Date Noted POA    PRINCIPAL PROBLEM:  Closed fracture of left distal radius [S52.502A] 10/11/2024 Yes      Problems Resolved During this Admission:       Assessment & Plan:   44M group home found to have left distal radius fx, nasal bone fx, and nondisplaced medial malleolus fx.    Consults:  Ortho- OR today  Face- non-op, can FU outpt w/Dr. Jackson     Distal radius fx  Nasal bone fx  Medial malleolus fx  [facility]  NPO, regular diet post-op  mIVF  IS  Daily labs  DVT ppx  PT/OT when able     Jayme Ballard MD  [facility] General Surgery PGY-1  Trauma Surgery

## 2024-10-12 NOTE — PLAN OF CARE
Problem: Adult Inpatient Plan of Care  Goal: Plan of Care Review  Outcome: Progressing  Goal: Patient-Specific Goal (Individualized)  Outcome: Progressing  Goal: Absence of Hospital-Acquired Illness or Injury  Outcome: Progressing  Goal: Optimal Comfort and Wellbeing  Outcome: Progressing  Goal: Readiness for Transition of Care  Outcome: Progressing     Problem: Fall Injury Risk  Goal: Absence of Fall and Fall-Related Injury  Outcome: Progressing     Problem: Orthopaedic Fracture  Goal: Absence of Bleeding  Outcome: Progressing  Goal: Bowel Elimination  Outcome: Progressing  Goal: Absence of Embolism Signs and Symptoms  Outcome: Progressing  Goal: Fracture Stability  Outcome: Progressing  Goal: Optimal Functional Ability  Outcome: Progressing  Goal: Absence of Infection Signs and Symptoms  Outcome: Progressing  Goal: Effective Tissue Perfusion  Outcome: Progressing  Goal: Optimal Pain Control and Function  Outcome: Progressing  Goal: Effective Oxygenation and Ventilation  Outcome: Progressing     Problem: Wound  Goal: Optimal Coping  Outcome: Progressing  Goal: Optimal Functional Ability  Outcome: Progressing  Goal: Absence of Infection Signs and Symptoms  Outcome: Progressing  Goal: Improved Oral Intake  Outcome: Progressing  Goal: Optimal Pain Control and Function  Outcome: Progressing  Goal: Skin Health and Integrity  Outcome: Progressing  Goal: Optimal Wound Healing  Outcome: Progressing

## 2024-10-13 LAB
ABS NEUT CALC (OHS): 15.07 X10(3)/MCL (ref 2.1–9.2)
ALBUMIN SERPL-MCNC: 3.2 G/DL (ref 3.5–5)
ALBUMIN/GLOB SERPL: 1.1 RATIO (ref 1.1–2)
ALP SERPL-CCNC: 79 UNIT/L (ref 40–150)
ALT SERPL-CCNC: 11 UNIT/L (ref 0–55)
ANION GAP SERPL CALC-SCNC: 10 MEQ/L
AST SERPL-CCNC: 18 UNIT/L (ref 5–34)
BILIRUB SERPL-MCNC: 0.5 MG/DL
BUN SERPL-MCNC: 10.3 MG/DL (ref 8.9–20.6)
CALCIUM SERPL-MCNC: 8.9 MG/DL (ref 8.4–10.2)
CHLORIDE SERPL-SCNC: 100 MMOL/L (ref 98–107)
CO2 SERPL-SCNC: 27 MMOL/L (ref 22–29)
CREAT SERPL-MCNC: 1.03 MG/DL (ref 0.73–1.18)
CREAT/UREA NIT SERPL: 10
EOSINOPHIL NFR BLD MANUAL: 0.39 X10(3)/MCL (ref 0–0.9)
EOSINOPHIL NFR BLD MANUAL: 2 % (ref 0–8)
ERYTHROCYTE [DISTWIDTH] IN BLOOD BY AUTOMATED COUNT: 13.2 % (ref 11.5–17)
GFR SERPLBLD CREATININE-BSD FMLA CKD-EPI: >60 ML/MIN/1.73/M2
GLOBULIN SER-MCNC: 2.9 GM/DL (ref 2.4–3.5)
GLUCOSE SERPL-MCNC: 106 MG/DL (ref 74–100)
HCT VFR BLD AUTO: 39.9 % (ref 42–52)
HGB BLD-MCNC: 12.8 G/DL (ref 14–18)
LYMPHOCYTES NFR BLD MANUAL: 11 % (ref 13–40)
LYMPHOCYTES NFR BLD MANUAL: 2.13 X10(3)/MCL
MCH RBC QN AUTO: 28.4 PG (ref 27–31)
MCHC RBC AUTO-ENTMCNC: 32.1 G/DL (ref 33–36)
MCV RBC AUTO: 88.7 FL (ref 80–94)
MONOCYTES NFR BLD MANUAL: 1.74 X10(3)/MCL (ref 0.1–1.3)
MONOCYTES NFR BLD MANUAL: 9 % (ref 2–11)
NEUTROPHILS NFR BLD MANUAL: 78 % (ref 47–80)
NRBC BLD AUTO-RTO: 0 %
PLATELET # BLD AUTO: 294 X10(3)/MCL (ref 130–400)
PLATELET # BLD EST: NORMAL 10*3/UL
PMV BLD AUTO: 10.5 FL (ref 7.4–10.4)
POTASSIUM SERPL-SCNC: 4.1 MMOL/L (ref 3.5–5.1)
PROT SERPL-MCNC: 6.1 GM/DL (ref 6.4–8.3)
RBC # BLD AUTO: 4.5 X10(6)/MCL (ref 4.7–6.1)
RBC MORPH BLD: NORMAL
SODIUM SERPL-SCNC: 137 MMOL/L (ref 136–145)
WBC # BLD AUTO: 19.32 X10(3)/MCL (ref 4.5–11.5)

## 2024-10-13 PROCEDURE — S4991 NICOTINE PATCH NONLEGEND: HCPCS

## 2024-10-13 PROCEDURE — 80053 COMPREHEN METABOLIC PANEL: CPT

## 2024-10-13 PROCEDURE — 85025 COMPLETE CBC W/AUTO DIFF WBC: CPT

## 2024-10-13 PROCEDURE — 25000003 PHARM REV CODE 250

## 2024-10-13 PROCEDURE — 36415 COLL VENOUS BLD VENIPUNCTURE: CPT

## 2024-10-13 PROCEDURE — 25000242 PHARM REV CODE 250 ALT 637 W/ HCPCS

## 2024-10-13 PROCEDURE — 63600175 PHARM REV CODE 636 W HCPCS: Performed by: SURGERY

## 2024-10-13 PROCEDURE — 99233 SBSQ HOSP IP/OBS HIGH 50: CPT | Mod: ,,, | Performed by: SURGERY

## 2024-10-13 PROCEDURE — 97163 PT EVAL HIGH COMPLEX 45 MIN: CPT

## 2024-10-13 PROCEDURE — 11000001 HC ACUTE MED/SURG PRIVATE ROOM

## 2024-10-13 PROCEDURE — 63600175 PHARM REV CODE 636 W HCPCS

## 2024-10-13 RX ADMIN — METHOCARBAMOL 500 MG: 500 TABLET ORAL at 03:10

## 2024-10-13 RX ADMIN — DOCUSATE SODIUM 100 MG: 100 CAPSULE, LIQUID FILLED ORAL at 08:10

## 2024-10-13 RX ADMIN — DOCUSATE SODIUM 100 MG: 100 CAPSULE, LIQUID FILLED ORAL at 09:10

## 2024-10-13 RX ADMIN — METHOCARBAMOL 500 MG: 500 TABLET ORAL at 10:10

## 2024-10-13 RX ADMIN — BACITRACIN: 500 OINTMENT TOPICAL at 09:10

## 2024-10-13 RX ADMIN — ENOXAPARIN SODIUM 40 MG: 40 INJECTION SUBCUTANEOUS at 08:10

## 2024-10-13 RX ADMIN — BACITRACIN: 500 OINTMENT TOPICAL at 08:10

## 2024-10-13 RX ADMIN — BACITRACIN: 500 OINTMENT TOPICAL at 03:10

## 2024-10-13 RX ADMIN — NICOTINE 1 PATCH: 14 PATCH TRANSDERMAL at 08:10

## 2024-10-13 RX ADMIN — PRAZOSIN HYDROCHLORIDE 4 MG: 2 CAPSULE ORAL at 08:10

## 2024-10-13 RX ADMIN — METHOCARBAMOL 500 MG: 500 TABLET ORAL at 06:10

## 2024-10-13 RX ADMIN — POLYETHYLENE GLYCOL 3350 17 GRAM ORAL POWDER PACKET 17 G: at 09:10

## 2024-10-13 RX ADMIN — ENOXAPARIN SODIUM 40 MG: 40 INJECTION SUBCUTANEOUS at 09:10

## 2024-10-13 RX ADMIN — OXYCODONE HYDROCHLORIDE 10 MG: 5 TABLET ORAL at 09:10

## 2024-10-13 RX ADMIN — LITHIUM CARBONATE 900 MG: 450 TABLET, EXTENDED RELEASE ORAL at 08:10

## 2024-10-13 RX ADMIN — FLUTICASONE FUROATE AND VILANTEROL TRIFENATATE 1 PUFF: 100; 25 POWDER RESPIRATORY (INHALATION) at 09:10

## 2024-10-13 RX ADMIN — CEFAZOLIN SODIUM 2 G: 2 SOLUTION INTRAVENOUS at 09:10

## 2024-10-13 RX ADMIN — OXYCODONE HYDROCHLORIDE 10 MG: 5 TABLET ORAL at 12:10

## 2024-10-13 RX ADMIN — OXYCODONE HYDROCHLORIDE 10 MG: 5 TABLET ORAL at 04:10

## 2024-10-13 RX ADMIN — LITHIUM CARBONATE 450 MG: 450 TABLET, EXTENDED RELEASE ORAL at 09:10

## 2024-10-13 RX ADMIN — ASPIRIN 81 MG: 81 TABLET, COATED ORAL at 09:10

## 2024-10-13 RX ADMIN — CEFAZOLIN SODIUM 2 G: 2 SOLUTION INTRAVENOUS at 12:10

## 2024-10-13 RX ADMIN — OXYCODONE HYDROCHLORIDE 10 MG: 5 TABLET ORAL at 05:10

## 2024-10-13 RX ADMIN — OXYCODONE HYDROCHLORIDE 10 MG: 5 TABLET ORAL at 08:10

## 2024-10-13 NOTE — PT/OT/SLP EVAL
Physical Therapy Evaluation    Patient Name:  Edward Wilks   MRN:  [id number]    Recommendations:     Discharge therapy intensity: High Intensity Therapy   Discharge Equipment Recommendations: to be determined by next level of care   Barriers to discharge: Impaired mobility and Ongoing medical needs    Assessment:     Edward Wilks is a 44 y.o. male admitted with a medical diagnosis of POD 1 L distal radius ORIF and R medial malleolus ORIF.  He presents with the following impairments/functional limitations: weakness, impaired endurance, impaired functional mobility, gait instability, impaired balance, pain, orthopedic precautions .    Rehab Prognosis: Good; patient would benefit from acute skilled PT services to address these deficits and reach maximum level of function.    Recent Surgery: Procedure(s) (LRB):  ORIF, FRACTURE, RADIUS, DISTAL (Left)  ORIF, ANKLE (Right) 1 Day Post-Op    Plan:     During this hospitalization, patient would benefit from acute PT services 6 x/week (6x to BID) to address the identified rehab impairments via gait training, therapeutic activities, therapeutic exercises, neuromuscular re-education and progress toward the following goals:    Plan of Care Expires:  11/13/24    Subjective     Chief Complaint: Pt reports pain in his R ankle and L wrist.   Patient/Family Comments/goals: To get better and go home  Pain/Comfort:  Pain Rating 1: 5/10  Location - Side 1: Right  Location 1: foot  Pain Addressed 1: Pre-medicate for activity, Cessation of Activity, Nurse notified  Pain Rating 2: 4/10  Location - Side 2: Left  Location 2: wrist  Pain Addressed 2: Pre-medicate for activity, Cessation of Activity, Nurse notified    Patients cultural, spiritual, Evangelical conflicts given the current situation: no    Living Environment:  Pt lives in a [facility] with his family.  Prior to admission, patients level of function was independent with gait and ADLs.  Equipment used at home: none.  DME  owned (not currently used): none.  Upon discharge, patient will have assistance from his family.    Objective:     Communicated with nurse prior to session.  Patient found HOB elevated with    upon PT entry to room.    General Precautions: Standard,    Orthopedic Precautions:RLE non weight bearing, LUE non weight bearing   Braces:    Respiratory Status: Nasal cannula, flow 4 L/min        Exams:  RLE ROM: WFL except ankle  RLE Strength: WFL except ankle  LLE ROM: WFL  LLE Strength: WFL  Skin integrity: Wound across the forehead      Functional Mobility:  Bed Mobility:     Supine to Sit: minimum assistance  Sit to Supine: minimum assistance  Transfers:     Sit to Stand:  minimum assistance and of 2 persons with platform walker  Gait: Pt ambulates 20 ft with min A with platform walker. Pt requires cueing and demonstration in order to improve mechanics 2/2 WB status. Pt fatigues quickly and returned back to bed with oxygen.       AM-PAC 6 CLICK MOBILITY  Total Score:        Treatment & Education:      Patient and spouse were provided with verbal education education regarding PT role/goals/POC, post-op precautions, safety awareness, and discharge/DME recommendations.  Understanding was verbalized.     Patient left HOB elevated with all lines intact, call button in reach, nurse notified, and wife present.    GOALS:   Multidisciplinary Problems       Physical Therapy Goals          Problem: Physical Therapy    Goal Priority Disciplines Outcome Interventions   Physical Therapy Goal     PT, PT/OT Progressing    Description: Goals to be met by: 11/13/24     Patient will increase functional independence with mobility by performing:    Supine to sit with Potter  Sit to stand transfer with Modified Potter  Gait  x 200 feet with Modified Potter using Rolling Walker with platform attachment.                          History:     Past Medical History:   Diagnosis Date    Epilepsy, unspecified, not intractable,  with status epilepticus     PTSD (post-traumatic stress disorder)     TBI (traumatic brain injury)     Venous insufficiency (chronic) (peripheral)        Past Surgical History:   Procedure Laterality Date    APPENDECTOMY      PHLEBOGRAPHY N/A 5/6/2024    Procedure: Venogram;  Surgeon: Patsy Rodriguez MD;  Location: [facility] CATH LAB;  Service: Cardiology;  Laterality: N/A;  ILIAC VENOGRAM VIA LFV    VASECTOMY         Time Tracking:     PT Received On: 10/13/24  PT Start Time: 1130     PT Stop Time: 1155  PT Total Time (min): 25 min     Billable Minutes: Evaluation 25      10/13/2024

## 2024-10-13 NOTE — PROGRESS NOTES
[facility]  Observation Unit  Orthopedics  Progress Note    Patient Name: Edward Wilks  MRN: [id number]  Admission Date: 10/11/2024  Hospital Length of Stay: 1 days  Attending Provider: Booker Ramos MD  Primary Care Provider: Unable, To Obtain  Follow-up For: Procedure(s) (LRB):  ORIF, FRACTURE, RADIUS, DISTAL (Left)  ORIF, ANKLE (Right)    Post-Operative Day: 1 Day Post-Op  Subjective:     Principal Problem:Closed fracture of left distal radius    Principal Orthopedic Problem: 1 Day Post-Op    Internal fixation left distal radius with volar plate and open reduction internal fixation right medial malleolus ankle fracture.    Interval History:  Patient is resting this morning states he did have some high pain overnight.  No numbness or tingling distally in the left upper extremity or right lower extremity.  Right lower extremity remains in a Cam boot.  He is able to flex and extend all digits.  States that pain is controlled with medications when he needs it as long as he takes it on a regular schedule.    Review of patient's allergies indicates:   Allergen Reactions    Keppra [levetiracetam]     Nickel sutures [surgical stainless steel]     Sulfa (sulfonamide antibiotics)        Current Facility-Administered Medications   Medication    acetaminophen tablet 650 mg    albuterol inhaler 1 puff    aspirin EC tablet 81 mg    bacitracin ointment    cefazolin (ANCEF) 2 gram in dextrose 5% 50 mL IVPB (premix)    clonazePAM tablet 1 mg    docusate sodium capsule 100 mg    enoxaparin injection 40 mg    fluticasone furoate-vilanteroL 100-25 mcg/dose diskus inhaler 1 puff    lithium CR tablet 450 mg    lithium CR tablet 900 mg    magnesium hydroxide 400 mg/5 ml suspension 2,400 mg    melatonin tablet 6 mg    methocarbamoL tablet 500 mg    nicotine 14 mg/24 hr 1 patch    oxyCODONE immediate release tablet 10 mg    oxyCODONE immediate release tablet 5 mg    polyethylene glycol packet 17 g    prazosin  "capsule 4 mg    ROPIvacaine (PF) 2 mg/ml (0.2%) solution 20 mL     Facility-Administered Medications Ordered in Other Encounters   Medication    diazePAM tablet 10 mg    diphenhydrAMINE capsule 50 mg     Objective:     Vital Signs (Most Recent):  Temp: 98 °F (36.7 °C) (10/13/24 0801)  Pulse: 81 (10/13/24 0801)  Resp: 17 (10/13/24 0801)  BP: 129/82 (10/13/24 0801)  SpO2: 95 % (10/13/24 0801) Vital Signs (24h Range):  Temp:  [97.7 °F (36.5 °C)-98.1 °F (36.7 °C)] 98 °F (36.7 °C)  Pulse:  [73-83] 81  Resp:  [14-20] 17  SpO2:  [90 %-100 %] 95 %  BP: ()/(68-91) 129/82     Weight: 105.2 kg (232 lb)  Height: 5' 10" (177.8 cm)  Body mass index is 33.29 kg/m².      Intake/Output Summary (Last 24 hours) at 10/13/2024 0954  Last data filed at 10/12/2024 2230  Gross per 24 hour   Intake 920 ml   Output 850 ml   Net 70 ml       Physical Exam:   General the patient is alert and oriented x3 no acute distress nontoxic-appearing appropriate affect.    Constitutional: Vital signs are examined and stable.  Resp: No signs of labored breathing    LUE: --Skin: Dressing CDI; sling removed today           -MSK: STR 5/5 AIN/PIN/Median/Radial/Ulnar motor           -Neuro:  Sensation intact to light touch C5-T1 dermatomes           -Lymphatic: No signs of lymphadenopathy, No signs of swelling,           -CV:Capillary refill is less than 2 seconds. Compartments Soft and compressible              RLE: -Skin: Dressing CDI, Cam boot in place           -MSK:  Tolerates gentle passive range of motion of the ankle, actively flexes and extends all digits and the knee           -Neuro:  Sensation intact to light touch L3-S1 dermatomes           -Lymphatic: No signs of lymphadenopathy           -CV: Capillary refill is less than 2 seconds. D people's palpable Compartments soft and compressible.        Diagnostic Findings:   Significant Labs:   Recent Lab Results  (Last 5 results in the past 72 hours)        10/13/24  0358   10/12/24  0521   " 10/12/24  0315   10/11/24  1858   10/11/24  1418        Phencyclidine       Negative         Albumin/Globulin Ratio 1.1     1.4           Neutrophils Abs Calc 15.0696               Albumin 3.2     3.3           ALP 79     73           ALT 11     12           Amphetamines, Urine       Positive         Anion Gap 10.0     7.0           Appearance, UA       Clear         AST 18     17           Bacteria, UA       None Seen         Barbituates, Urine       Negative         Baso #     0.04           Basophil %     0.3           Benzodiazepine, Urine       Negative         Bilirubin (UA)       Negative         BILIRUBIN TOTAL 0.5     0.7           BUN 10.3     12.1           BUN/CREAT RATIO 10     12           Calcium 8.9     8.6           Cannabinoids, Urine       Positive         Chloride 100     103           CO2 27     26           Cocaine, Urine       Negative         Color, UA       Light-Yellow         Creatinine 1.03     1.01           eGFR >60     >60           Eos # 0.3864     0.14           Eos % 2     0.9           Fentanyl, Urine       Positive         Globulin, Total 2.9     2.3           Glucose 106     100           Glucose, UA       Normal         Group & Rh   O POS             Hematocrit 39.9     39.6           Hemoglobin 12.8     13.0           Immature Grans (Abs)     0.18           Immature Granulocytes     1.1           Indirect Bala GEL   NEG             Ketones, UA       Negative         Leukocyte Esterase, UA       Negative         Lymph # 2.1252     2.54           LYMPH %     15.9           Lymphocyte % 11               Magnesium      2.10           MCH 28.4     28.8           MCHC 32.1     32.8           MCV 88.7     87.6           MDMA, Urine       Negative         Mono # 1.7388     1.88           Mono % 9     11.8           MPV 10.5     10.2           Mucous, UA       Trace         Neut #     11.16           Neut %     70.0           Neutrophils Relative 78               NITRITE UA        Negative         nRBC 0.0     0.0           Blood, UA       Negative         QRS Duration         110       [facility] QTC Calculation         447       Opiates, Urine       Positive         pH, UA       6.0         pH, Urine       6.0         Phosphorus Level     4.4           Platelet Estimate Normal               Platelet Count 294     270           Potassium 4.1     3.6           PROTEIN TOTAL 6.1     5.6           Protein, UA       Negative         RBC 4.50     4.52           RBC Morph Normal               RBC, UA       0-5         RDW 13.2     13.3           Sodium 137     136           Specific Gravity,UA       1.044         Specific Gravity, Urine Auto       1.044         Specimen Outdate   10/15/2024 23:59             Squamous Epithelial Cells, UA       None Seen         Urobilinogen, UA       Normal         WBC, UA       0-5         WBC 19.32     15.94                                   Significant Imaging: I have reviewed and interpreted all pertinent imaging results/findings.     Assessment/Plan:     Active Diagnoses:    Diagnosis Date Noted POA    PRINCIPAL PROBLEM:  Closed fracture of left distal radius [S52.502A] 10/11/2024 Yes    Closed nondisp fracture of right medial malleolus [S82.54XD] 10/12/2024 Not Applicable    Closed right ankle fracture [S82.891A] 10/12/2024 Yes      Problems Resolved During this Admission:   Patient is a 44-year-old male postop day 1 ORIF left wrist and right ankle.   Labs are stable this morning.    Weight-bearing:  Nonweightbearing to the right lower extremity left upper extremity.  May weightbear through the left elbow for use of platform walker.  We will order a cock-up wrist brace for the left upper extremity this morning.  Continue multimodal pain control.  24 hours Ancef  Continue Plavix and aspirin for DVT prophylaxis during stay.  This is appropriate for discharge as well  Daily dry dressing changes to the right lower extremity and left upper extremity beginning  tomorrow  We will continue to follow through his stay please call with any questions or concerns.        The above findings, diagnostics, and treatment plan were discussed with Dr. Barrientos who is in agreement with the plan of care except as stated in additional documentation.      Mendy Fang PA-C    Orthopedic Trauma Surgery  [facility]

## 2024-10-13 NOTE — PLAN OF CARE
Problem: Adult Inpatient Plan of Care  Goal: Plan of Care Review  Outcome: Progressing  Goal: Patient-Specific Goal (Individualized)  Outcome: Progressing  Goal: Absence of Hospital-Acquired Illness or Injury  Outcome: Progressing  Goal: Optimal Comfort and Wellbeing  Outcome: Progressing  Goal: Readiness for Transition of Care  Outcome: Progressing     Problem: Fall Injury Risk  Goal: Absence of Fall and Fall-Related Injury  Outcome: Progressing     Problem: Orthopaedic Fracture  Goal: Absence of Bleeding  Outcome: Progressing  Goal: Bowel Elimination  Outcome: Progressing  Goal: Absence of Embolism Signs and Symptoms  Outcome: Progressing  Goal: Fracture Stability  Outcome: Progressing  Goal: Optimal Functional Ability  Outcome: Progressing  Goal: Absence of Infection Signs and Symptoms  Outcome: Progressing  Goal: Effective Tissue Perfusion  Outcome: Progressing  Goal: Optimal Pain Control and Function  Outcome: Progressing  Goal: Effective Oxygenation and Ventilation  Outcome: Progressing

## 2024-10-13 NOTE — PROGRESS NOTES
Trauma Surgery   Progress Note  Patient Name: Edward Wilks                   : 1980     MRN: [id number]   Date of Admission: 10/11/2024  Code Status: Full Code  Date of Exam: 10/13/2024  HD#1  POD#1 Day Post-Op  Attending Provider: Booker Ramos MD    Subjective:   Interval history:  S/p left radius ORIF, right medial malleolus ORIF 10/12.  AF HDS. Reports left forearm, right ankle pain this morning.   Hgb 12.    Home Meds:   Current Outpatient Medications   Medication Instructions    ALBUTEROL INHL 90 mcg, See admin instructions    aspirin (ECOTRIN) 81 mg, Oral, Daily    clonazePAM (KLONOPIN) 1 mg, Daily PRN    clopidogreL (PLAVIX) 75 mg, Oral, Daily    dextroamphetamine-amphetamine (ADDERALL XR) 30 MG 24 hr capsule 30 mg, Every morning    dextroamphetamine-amphetamine 10 mg Tab 1 tablet, After lunch    fluticasone-salmeterol diskus inhaler 100-50 mcg 1 puff, 2 times daily    gabapentin (NEURONTIN) 300 mg, 3 times daily    hydroCHLOROthiazide (HYDRODIURIL) 12.5 mg, Daily PRN    lithium (ESKALITH) 900 mg, Nightly    loratadine (CLARITIN) 10 mg, Daily    prazosin (MINIPRESS) 2 mg, Nightly    sildenafiL (VIAGRA) 50 mg, Oral, Daily PRN    vitamin D (VITAMIN D3) 1000 units Tab 1 tablet, Daily      Scheduled Meds:   acetaminophen  650 mg Oral Q4H    aspirin  81 mg Oral Daily    bacitracin   Topical (Top) TID    ceFAZolin (Ancef) IV (PEDS and ADULTS)  2 g Intravenous Q8H    docusate sodium  100 mg Oral BID    enoxparin  40 mg Subcutaneous Q12H    fluticasone furoate-vilanteroL  1 puff Inhalation Daily    lithium  450 mg Oral Daily    lithium  900 mg Oral QHS    methocarbamoL  500 mg Oral Q8H    nicotine  1 patch Transdermal Daily    polyethylene glycol  17 g Oral BID    prazosin  4 mg Oral QHS    ROPIvacaine (PF) 2 mg/ml (0.2%)  20 mL Perineural Once     Continuous Infusions:   lactated ringers   Intravenous Continuous 100 mL/hr at 10/12/24 0259 New Bag at 10/12/24 0259     PRN Meds:  Current  "Facility-Administered Medications:     albuterol, 1 puff, Inhalation, Q6H PRN    clonazePAM, 1 mg, Oral, Daily PRN    magnesium hydroxide 400 mg/5 ml, 30 mL, Oral, Daily PRN    melatonin, 6 mg, Oral, Nightly PRN    oxyCODONE, 10 mg, Oral, Q4H PRN    oxyCODONE, 5 mg, Oral, Q4H PRN     Objective:     VITAL SIGNS: 24 HR MIN & MAX LAST   Temp  Min: 97.7 °F (36.5 °C)  Max: 98.1 °F (36.7 °C)  98 °F (36.7 °C)   BP  Min: 95/70  Max: 149/88  129/82    Pulse  Min: 73  Max: 83  81    Resp  Min: 14  Max: 20  17    SpO2  Min: 90 %  Max: 100 %  95 %      HT: 5' 10" (177.8 cm)  WT: 105.2 kg (232 lb)  BMI: 33.3     Intake/output:  Intake/Output - Last 3 Shifts         10/11 0700  10/12 0659 10/12 0700  10/13 0659 10/13 0700  10/14 0659    P.O. 240 120     IV Piggyback  800     Total Intake(mL/kg) 240 (2.3) 920 (8.7)     Urine (mL/kg/hr) 650 750 (0.3)     Stool 0 0     Blood  100     Total Output 650 850     Net -410 +70            Stool Occurrence 0 x 0 x             Intake/Output Summary (Last 24 hours) at 10/13/2024 0837  Last data filed at 10/12/2024 2230  Gross per 24 hour   Intake 920 ml   Output 850 ml   Net 70 ml         Lines/drains/airway:       Peripheral IV - Single Lumen 10/11/24 1836 20 G Anterior;Right Forearm (Active)   Site Assessment Clean;Dry;Intact;No redness;No swelling;No warmth;No drainage 10/12/24 0600   Extremity Assessment Distal to IV No abnormal discoloration;No redness;No swelling;No warmth 10/12/24 0600   Line Status Infusing 10/12/24 0600   Dressing Status Clean;Dry;Intact 10/12/24 0600   Dressing Intervention Integrity maintained 10/12/24 0600   Number of days: 0       Physical examination:  Gen: NAD, AAOx3, answering questions appropriately  HEENT: Right eyebrow laceration with sutures, scattered abrasions  CV: RR  Resp: NWOB  Abd: S/NT/ND  Msk: moving all extremities spontaneously and purposefully, LUE in splint, RLE in splint  Neuro: CN II-XII grossly intact  Skin/wounds: warm, dry, scattered " "abrasions to extremities    Labs:  Renal:  Recent Labs     10/11/24  1303 10/12/24  0315 10/13/24  0358   BUN 12.5 12.1 10.3   CREATININE 1.13 1.01 1.03     No results for input(s): "LACTIC" in the last 72 hours.  FEN/GI:  Recent Labs     10/11/24  1303 10/12/24  0315 10/13/24  0358    136 137   K 4.6 3.6 4.1    103 100   CO2 24 26 27   CALCIUM 9.6 8.6 8.9   MG  --  2.10  --    PHOS  --  4.4  --    ALBUMIN 4.0 3.3* 3.2*   BILITOT 0.3 0.7 0.5   AST 18 17 18   ALKPHOS 78 73 79   ALT 15 12 11     Heme:  Recent Labs     10/11/24  1303 10/12/24  0315 10/13/24  0358   HGB 14.9 13.0* 12.8*   HCT 46.2 39.6* 39.9*    270 294   INR 0.9  --   --      ID:  Recent Labs     10/11/24  1303 10/12/24  0315 10/13/24  0358   WBC 17.13* 15.94* 19.32*     CBG:  Recent Labs     10/11/24  1303 10/12/24  0315 10/13/24  0358   GLUCOSE 124* 100 106*      No results for input(s): "POCTGLUCOSE" in the last 72 hours.   Cardiovascular:  No results for input(s): "TROPONINI", "CKTOTAL", "CKMB", "BNP" in the last 168 hours.  I have reviewed all pertinent lab results within the past 24 hours.    Imaging:  X-Ray Ankle Complete Right   Final Result      Postop ORIF in satisfactory alignment.         Electronically signed by: Ingris Figueroa   Date:    10/12/2024   Time:    12:19      X-Ray Wrist Complete Left   Final Result      Postop volar plate and screw distal radius ORIF in satisfactory alignment.         Electronically signed by: Ingris Figueroa   Date:    10/12/2024   Time:    12:15      SURG FL Surgery Fluoro Usage   Final Result      CT 3D Rendering WO Independent Workstation   Final Result      X-Ray Knee Complete 4 or More Views Left   Final Result      No acute osseous abnormality.         Electronically signed by: Sina Morrow   Date:    10/11/2024   Time:    14:40      X-Ray Wrist 2 View Left   Final Result      Improved alignment of the distal radial fracture following reduction and splinting.       "   Electronically signed by: Argenis Sahni   Date:    10/11/2024   Time:    14:42      X-Ray Ankle Complete Right   Final Result      Nondisplaced fracture of the medial malleolus.         Electronically signed by: Argenis Sahni   Date:    10/11/2024   Time:    14:40      CT Chest Abdomen Pelvis With IV Contrast (XPD) NO Oral Contrast   Final Result      No posttraumatic abnormality of the chest abdomen and pelvis.         Electronically signed by: Hugo Spicer MD   Date:    10/11/2024   Time:    13:57      CT Cervical Spine Without Contrast   Final Result      No appreciable acute osseous abnormality by CT evaluation         Electronically signed by: Ingris Figueroa   Date:    10/11/2024   Time:    13:59      CT Head Without Contrast   Final Result      Fracture of the nasal bone and nasal ridge      Soft tissue swelling in the forehead on the right         Electronically signed by: Andrew Soria   Date:    10/11/2024   Time:    14:01      X-Ray Wrist Complete Left   Final Result      Displaced and angulated fracture of the distal radius      Ulnar styloid fracture         Electronically signed by: Ignris Figueroa   Date:    10/11/2024   Time:    13:57      X-Ray Pelvis Routine AP   Final Result      No acute osseous abnormality.         Electronically signed by: Ingris Figueroa   Date:    10/11/2024   Time:    13:56      X-Ray Chest 1 View   Final Result      Increase interstitial markings.      Otherwise no active pulmonary disease         Electronically signed by: Sina Morrow   Date:    10/11/2024   Time:    13:27         I have reviewed all pertinent imaging results/findings within the past 24 hours.    Micro/Path/Other:  Microbiology Results (last 7 days)       ** No results found for the last 168 hours. **           Pathology Results  (Last 7 days)      None             Problems list:  Active Problem List with Overview Notes    Diagnosis Date Noted    Closed nondisp fracture of right medial  malleolus 10/12/2024    Closed right ankle fracture 10/12/2024    Closed fracture of left distal radius 10/11/2024      Active Diagnoses:    Diagnosis Date Noted POA    PRINCIPAL PROBLEM:  Closed fracture of left distal radius [S52.502A] 10/11/2024 Yes    Closed nondisp fracture of right medial malleolus [S82.54XD] 10/12/2024 Not Applicable    Closed right ankle fracture [S82.891A] 10/12/2024 Yes      Problems Resolved During this Admission:       Assessment & Plan:   44[place] [facility] [place] distal radius fx, nasal bone fx, and nondisplaced medial malleolus fx.    Consults:  Ortho- OR 10/12  Face- non-op, can FU outpt w/Dr. Jackson     Distal radius fx  Nasal bone fx  Medial malleolus fx  [facility]  Regular diet  IS  Daily labs  DVT ppx  PT/OT when able       Maxim Power MD  General Surgery PGY-1  [facility]

## 2024-10-13 NOTE — PLAN OF CARE
Problem: Physical Therapy  Goal: Physical Therapy Goal  Description: Goals to be met by: 11/13/24     Patient will increase functional independence with mobility by performing:    Supine to sit with [place]  Sit to stand transfer with Modified [place]  Gait  x 200 feet with Modified [place] using Rolling Walker with platform attachment.     Outcome: Progressing

## 2024-10-14 PROBLEM — S02.2XXA CLOSED FRACTURE OF NASAL BONE: Status: ACTIVE | Noted: 2024-10-14
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
## 2024-10-14 LAB
ALBUMIN SERPL-MCNC: 3 G/DL (ref 3.5–5)
ALBUMIN/GLOB SERPL: 1.2 RATIO (ref 1.1–2)
ALP SERPL-CCNC: 71 UNIT/L (ref 40–150)
ALT SERPL-CCNC: 13 UNIT/L (ref 0–55)
ANION GAP SERPL CALC-SCNC: 8 MEQ/L
AST SERPL-CCNC: 31 UNIT/L (ref 5–34)
BASOPHILS # BLD AUTO: 0.05 X10(3)/MCL
BASOPHILS NFR BLD AUTO: 0.3 %
BILIRUB SERPL-MCNC: 0.8 MG/DL
BUN SERPL-MCNC: 12.2 MG/DL (ref 8.9–20.6)
CALCIUM SERPL-MCNC: 8.4 MG/DL (ref 8.4–10.2)
CHLORIDE SERPL-SCNC: 99 MMOL/L (ref 98–107)
CO2 SERPL-SCNC: 28 MMOL/L (ref 22–29)
CREAT SERPL-MCNC: 0.91 MG/DL (ref 0.73–1.18)
CREAT/UREA NIT SERPL: 13
CRP SERPL-MCNC: 109.8 MG/L
EOSINOPHIL # BLD AUTO: 0.47 X10(3)/MCL (ref 0–0.9)
EOSINOPHIL NFR BLD AUTO: 3.1 %
ERYTHROCYTE [DISTWIDTH] IN BLOOD BY AUTOMATED COUNT: 13.2 % (ref 11.5–17)
GFR SERPLBLD CREATININE-BSD FMLA CKD-EPI: >60 ML/MIN/1.73/M2
GLOBULIN SER-MCNC: 2.6 GM/DL (ref 2.4–3.5)
GLUCOSE SERPL-MCNC: 110 MG/DL (ref 74–100)
HCT VFR BLD AUTO: 37.2 % (ref 42–52)
HGB BLD-MCNC: 12.3 G/DL (ref 14–18)
IMM GRANULOCYTES # BLD AUTO: 0.19 X10(3)/MCL (ref 0–0.04)
IMM GRANULOCYTES NFR BLD AUTO: 1.2 %
LYMPHOCYTES # BLD AUTO: 1.83 X10(3)/MCL (ref 0.6–4.6)
LYMPHOCYTES NFR BLD AUTO: 11.9 %
MCH RBC QN AUTO: 28.9 PG (ref 27–31)
MCHC RBC AUTO-ENTMCNC: 33.1 G/DL (ref 33–36)
MCV RBC AUTO: 87.3 FL (ref 80–94)
MONOCYTES # BLD AUTO: 1.76 X10(3)/MCL (ref 0.1–1.3)
MONOCYTES NFR BLD AUTO: 11.5 %
NEUTROPHILS # BLD AUTO: 11.06 X10(3)/MCL (ref 2.1–9.2)
NEUTROPHILS NFR BLD AUTO: 72 %
NRBC BLD AUTO-RTO: 0 %
PLATELET # BLD AUTO: 264 X10(3)/MCL (ref 130–400)
PMV BLD AUTO: 10.3 FL (ref 7.4–10.4)
POTASSIUM SERPL-SCNC: 4.1 MMOL/L (ref 3.5–5.1)
PREALB SERPL-MCNC: 18.7 MG/DL (ref 18–45)
PROT SERPL-MCNC: 5.6 GM/DL (ref 6.4–8.3)
RBC # BLD AUTO: 4.26 X10(6)/MCL (ref 4.7–6.1)
SODIUM SERPL-SCNC: 135 MMOL/L (ref 136–145)
WBC # BLD AUTO: 15.36 X10(3)/MCL (ref 4.5–11.5)

## 2024-10-14 PROCEDURE — 63600175 PHARM REV CODE 636 W HCPCS

## 2024-10-14 PROCEDURE — 25000242 PHARM REV CODE 250 ALT 637 W/ HCPCS

## 2024-10-14 PROCEDURE — 85025 COMPLETE CBC W/AUTO DIFF WBC: CPT

## 2024-10-14 PROCEDURE — 27000221 HC OXYGEN, UP TO 24 HOURS

## 2024-10-14 PROCEDURE — 11000001 HC ACUTE MED/SURG PRIVATE ROOM

## 2024-10-14 PROCEDURE — 25000003 PHARM REV CODE 250

## 2024-10-14 PROCEDURE — 5A09357 ASSISTANCE WITH RESPIRATORY VENTILATION, LESS THAN 24 CONSECUTIVE HOURS, CONTINUOUS POSITIVE AIRWAY PRESSURE: ICD-10-PCS | Performed by: SURGERY

## 2024-10-14 PROCEDURE — 86140 C-REACTIVE PROTEIN: CPT

## 2024-10-14 PROCEDURE — 94660 CPAP INITIATION&MGMT: CPT

## 2024-10-14 PROCEDURE — 27100171 HC OXYGEN HIGH FLOW UP TO 24 HOURS

## 2024-10-14 PROCEDURE — 97530 THERAPEUTIC ACTIVITIES: CPT

## 2024-10-14 PROCEDURE — 84134 ASSAY OF PREALBUMIN: CPT

## 2024-10-14 PROCEDURE — 27000190 HC CPAP FULL FACE MASK W/VALVE

## 2024-10-14 PROCEDURE — 80053 COMPREHEN METABOLIC PANEL: CPT

## 2024-10-14 PROCEDURE — 99900035 HC TECH TIME PER 15 MIN (STAT)

## 2024-10-14 PROCEDURE — 97116 GAIT TRAINING THERAPY: CPT

## 2024-10-14 PROCEDURE — 36415 COLL VENOUS BLD VENIPUNCTURE: CPT

## 2024-10-14 PROCEDURE — S4991 NICOTINE PATCH NONLEGEND: HCPCS

## 2024-10-14 RX ORDER — CLOPIDOGREL BISULFATE 75 MG/1
75 TABLET ORAL DAILY
Status: DISCONTINUED | OUTPATIENT
Start: 2024-10-14 | End: 2024-10-17 | Stop reason: HOSPADM

## 2024-10-14 RX ORDER — HYDROCHLOROTHIAZIDE 12.5 MG/1
12.5 TABLET ORAL DAILY
Status: DISCONTINUED | OUTPATIENT
Start: 2024-10-14 | End: 2024-10-17 | Stop reason: HOSPADM

## 2024-10-14 RX ORDER — ENOXAPARIN SODIUM 100 MG/ML
40 INJECTION SUBCUTANEOUS EVERY 12 HOURS
Status: DISCONTINUED | OUTPATIENT
Start: 2024-10-14 | End: 2024-10-17 | Stop reason: HOSPADM

## 2024-10-14 RX ORDER — CHOLECALCIFEROL (VITAMIN D3) 25 MCG
1000 TABLET ORAL DAILY
Status: DISCONTINUED | OUTPATIENT
Start: 2024-10-14 | End: 2024-10-17 | Stop reason: HOSPADM

## 2024-10-14 RX ADMIN — METHOCARBAMOL 500 MG: 500 TABLET ORAL at 05:10

## 2024-10-14 RX ADMIN — POLYETHYLENE GLYCOL 3350 17 GRAM ORAL POWDER PACKET 17 G: at 09:10

## 2024-10-14 RX ADMIN — ACETAMINOPHEN 325MG 650 MG: 325 TABLET ORAL at 10:10

## 2024-10-14 RX ADMIN — METHOCARBAMOL 500 MG: 500 TABLET ORAL at 04:10

## 2024-10-14 RX ADMIN — ENOXAPARIN SODIUM 40 MG: 40 INJECTION SUBCUTANEOUS at 10:10

## 2024-10-14 RX ADMIN — OXYCODONE HYDROCHLORIDE 10 MG: 5 TABLET ORAL at 05:10

## 2024-10-14 RX ADMIN — ENOXAPARIN SODIUM 40 MG: 40 INJECTION SUBCUTANEOUS at 09:10

## 2024-10-14 RX ADMIN — DOCUSATE SODIUM 100 MG: 100 CAPSULE, LIQUID FILLED ORAL at 09:10

## 2024-10-14 RX ADMIN — LITHIUM CARBONATE 900 MG: 450 TABLET, EXTENDED RELEASE ORAL at 09:10

## 2024-10-14 RX ADMIN — METHOCARBAMOL 500 MG: 500 TABLET ORAL at 09:10

## 2024-10-14 RX ADMIN — OXYCODONE HYDROCHLORIDE 10 MG: 5 TABLET ORAL at 09:10

## 2024-10-14 RX ADMIN — Medication 1000 UNITS: at 09:10

## 2024-10-14 RX ADMIN — PRAZOSIN HYDROCHLORIDE 4 MG: 2 CAPSULE ORAL at 09:10

## 2024-10-14 RX ADMIN — BACITRACIN: 500 OINTMENT TOPICAL at 04:10

## 2024-10-14 RX ADMIN — ASPIRIN 81 MG: 81 TABLET, COATED ORAL at 09:10

## 2024-10-14 RX ADMIN — OXYCODONE HYDROCHLORIDE 10 MG: 5 TABLET ORAL at 04:10

## 2024-10-14 RX ADMIN — HYDROCHLOROTHIAZIDE 12.5 MG: 12.5 TABLET ORAL at 09:10

## 2024-10-14 RX ADMIN — NICOTINE 1 PATCH: 14 PATCH TRANSDERMAL at 09:10

## 2024-10-14 RX ADMIN — BACITRACIN: 500 OINTMENT TOPICAL at 09:10

## 2024-10-14 RX ADMIN — ACETAMINOPHEN 325MG 650 MG: 325 TABLET ORAL at 09:10

## 2024-10-14 RX ADMIN — LITHIUM CARBONATE 450 MG: 450 TABLET, EXTENDED RELEASE ORAL at 09:10

## 2024-10-14 RX ADMIN — FLUTICASONE FUROATE AND VILANTEROL TRIFENATATE 1 PUFF: 100; 25 POWDER RESPIRATORY (INHALATION) at 09:10

## 2024-10-14 RX ADMIN — CLOPIDOGREL BISULFATE 75 MG: 75 TABLET ORAL at 09:10

## 2024-10-14 NOTE — PT/OT/SLP PROGRESS
Physical Therapy Treatment    Patient Name:  Edward Wilks   MRN:  [id number]    Recommendations:     Discharge therapy intensity: High Intensity Therapy   Discharge Equipment Recommendations: to be determined by next level of care  Barriers to discharge: Impaired mobility and Ongoing medical needs    Assessment:     Edward Wilks is a 44 y.o. male admitted with a medical diagnosis of assisted: L distal radius fx s/p ORIF, R medial malleolar fx s/p ORIF, nasal bone fx (non-op).  He presents with the following impairments/functional limitations: weakness, impaired endurance, impaired functional mobility, impaired self care skills, gait instability, impaired balance, orthopedic precautions. Pt tolerated session fairly well, mobility limited by back pain today, RN notified. Pt requiring min-modA for bed mobility, modA for transfers, and ambulated 10' with L platform RW. Pt able to tolerate sitting in recliner chair for 2.5hrs. Will continue to progress as tolerated.    Rehab Prognosis: Good; patient would benefit from acute skilled PT services to address these deficits and reach maximum level of function.    Recent Surgery: Procedure(s) (LRB):  ORIF, FRACTURE, RADIUS, DISTAL (Left)  ORIF, ANKLE (Right) 2 Days Post-Op    Plan:     During this hospitalization, patient would benefit from acute PT services 6 x/week to address the identified rehab impairments via gait training, therapeutic activities, therapeutic exercises, neuromuscular re-education and progress toward the following goals:    Plan of Care Expires:  11/13/24    Subjective     Chief Complaint: back pain  Patient/Family Comments/goals: to return to PLOF  Pain/Comfort:  Pain Rating 1: 6/10  Location 1: back  Pain Addressed 1: Cessation of Activity, Nurse notified, Reposition      Objective:     Communicated with RN prior to session.  Patient found HOB elevated with telemetry, oxygen (CAM boot, wrist brace) upon PT entry to room.     General  Precautions: Standard, fall  Orthopedic Precautions: RLE non weight bearing, LUE non weight bearing  Braces:  (R CAM boot, L wrist splint)  Respiratory Status: Nasal cannula, flow 3 L/min  Blood Pressure: NT  Skin Integrity: Visible skin intact      Functional Mobility:  Bed Mobility:     Supine to Sit: minimum assistance  Sit to Supine: moderate assistance  Transfers:     Sit to Stand:  minimum assistance and of 2 persons with L platform RW  Chair to Bed: moderate assistance with  L platform RW  using  Stand Pivot  Gait: Pt amb x 10' modA with L platform RW, pt groaning in pain t/o, complaining of back pain and minor L wrist discomfort. Pt requesting to sit 2/2 fatigue and back pain.    Therapeutic Activities/Exercises:  Pt left sitting up in recliner at end of AM session. Pt tolerated sitting for ~2.5 hours before requesting to get back to bed. PT assist pt in modA stand pivot BTB with L platform RW.    Education:  Patient provided with verbal education education regarding PT role/goals/POC, post-op precautions, fall prevention, and safety awareness.  Understanding was verbalized.     Patient left  up in chair for AM session, HOB elevated for PM session  with all lines intact, call button in reach, and RN notified    GOALS:   Multidisciplinary Problems       Physical Therapy Goals          Problem: Physical Therapy    Goal Priority Disciplines Outcome Interventions   Physical Therapy Goal     PT, PT/OT Progressing    Description: Goals to be met by: 11/13/24     Patient will increase functional independence with mobility by performing:    Supine to sit with [place]  Sit to stand transfer with Modified [place]  Gait  x 200 feet with Modified [place] using Rolling Walker with platform attachment.                          Time Tracking:     PT Received On: 10/14/24  PT Start Time: 1125 + 1431    PT Stop Time: 1152 + 1442  PT Total Time (min): 27 min + 11 min    Billable Minutes: Gait Training 12 and  Therapeutic Activity 25    Treatment Type: Treatment  PT/PTA: PT     Number of PTA visits since last PT visit: 1     10/14/2024

## 2024-10-14 NOTE — PLAN OF CARE
Problem: Adult Inpatient Plan of Care  Goal: Plan of Care Review  10/13/2024 1931 by Papa Ramos RN  Outcome: Progressing  10/13/2024 1930 by Papa Ramos RN  Outcome: Progressing  Goal: Patient-Specific Goal (Individualized)  10/13/2024 1931 by Papa Ramos RN  Outcome: Progressing  10/13/2024 1930 by Papa Ramos RN  Outcome: Progressing  Goal: Absence of Hospital-Acquired Illness or Injury  10/13/2024 1931 by Papa Ramos RN  Outcome: Progressing  10/13/2024 1930 by Papa Ramos RN  Outcome: Progressing  Goal: Optimal Comfort and Wellbeing  10/13/2024 1931 by Papa Ramos RN  Outcome: Progressing  10/13/2024 1930 by Papa Ramos RN  Outcome: Progressing  Goal: Readiness for Transition of Care  10/13/2024 1931 by Papa Ramos RN  Outcome: Progressing  10/13/2024 1930 by Papa Ramos RN  Outcome: Progressing     Problem: Fall Injury Risk  Goal: Absence of Fall and Fall-Related Injury  10/13/2024 1931 by Papa Ramos RN  Outcome: Progressing  10/13/2024 1930 by Papa Ramos RN  Outcome: Progressing     Problem: Orthopaedic Fracture  Goal: Absence of Bleeding  10/13/2024 1931 by Papa Ramos RN  Outcome: Progressing  10/13/2024 1930 by Papa Ramos RN  Outcome: Progressing  Goal: Bowel Elimination  10/13/2024 1931 by Papa Ramos RN  Outcome: Progressing  10/13/2024 1930 by Papa Ramos RN  Outcome: Progressing  Goal: Absence of Embolism Signs and Symptoms  10/13/2024 1931 by Papa Ramos RN  Outcome: Progressing  10/13/2024 1930 by Papa Ramos RN  Outcome: Progressing  Goal: Fracture Stability  10/13/2024 1931 by Papa Ramos RN  Outcome: Progressing  10/13/2024 1930 by Ramos, Christopher, RN  Outcome: Progressing  Goal: Optimal Functional Ability  10/13/2024 1931 by Rachel,  ALEXANDER Elam  Outcome: Progressing  10/13/2024 1930 by Papa Ramos RN  Outcome: Progressing  Goal: Absence of Infection Signs and Symptoms  10/13/2024 1931 by Papa Ramos RN  Outcome: Progressing  10/13/2024 1930 by Papa Ramos RN  Outcome: Progressing  Goal: Effective Tissue Perfusion  10/13/2024 1931 by Papa Ramos RN  Outcome: Progressing  10/13/2024 1930 by Papa Ramos RN  Outcome: Progressing  Goal: Optimal Pain Control and Function  10/13/2024 1931 by Papa Ramos RN  Outcome: Progressing  10/13/2024 1930 by Papa Ramos RN  Outcome: Progressing  Goal: Effective Oxygenation and Ventilation  10/13/2024 1931 by Papa Ramos RN  Outcome: Progressing  10/13/2024 1930 by Papa Ramos RN  Outcome: Progressing     Problem: Wound  Goal: Optimal Coping  10/13/2024 1931 by Papa Ramos RN  Outcome: Progressing  10/13/2024 1930 by Papa Ramos RN  Outcome: Progressing  Goal: Optimal Functional Ability  10/13/2024 1931 by Papa Ramos RN  Outcome: Progressing  10/13/2024 1930 by Papa Ramos RN  Outcome: Progressing  Goal: Absence of Infection Signs and Symptoms  10/13/2024 1931 by Papa Ramos RN  Outcome: Progressing  10/13/2024 1930 by Papa Ramos RN  Outcome: Progressing  Goal: Improved Oral Intake  10/13/2024 1931 by Papa Ramos RN  Outcome: Progressing  10/13/2024 1930 by Papa Ramos RN  Outcome: Progressing  Goal: Optimal Pain Control and Function  10/13/2024 1931 by Papa Ramos RN  Outcome: Progressing  10/13/2024 1930 by Papa Ramos RN  Outcome: Progressing  Goal: Skin Health and Integrity  10/13/2024 1931 by Papa Ramos RN  Outcome: Progressing  10/13/2024 1930 by Ramos, Christopher, RN  Outcome: Progressing  Goal: Optimal Wound Healing  10/13/2024 1931 by  Papa Ramos, RN  Outcome: Progressing  10/13/2024 1930 by Papa Ramos, RN  Outcome: Progressing

## 2024-10-14 NOTE — PT/OT/SLP PROGRESS
Occupational Therapy      Patient Name:  Edward Wilks   MRN:  [id number]    Attempted to see pt for OT eval. Pt declined stating he had just returned back to bed c PT. Pt agreeable to participate tomorrow. Will follow up as schedule permits.     10/14/2024
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
## 2024-10-14 NOTE — PROGRESS NOTES
[facility] - Observation Unit  Orthopedics  Progress Note    Patient Name: Edward Wilks  MRN: [id number]  Admission Date: 10/11/2024  Hospital Length of Stay: 2 days  Attending Provider: Xu Núñez MD  Primary Care Provider: Unable, To Obtain  Follow-up For: Procedure(s) (LRB):  ORIF, FRACTURE, RADIUS, DISTAL (Left)  ORIF, ANKLE (Right)    Post-Operative Day: 2 Day Post-Op  Subjective:     Principal Problem:Closed fracture of left distal radius    Principal Orthopedic Problem: 2 Days Post-Op    Internal fixation left distal radius with volar plate and open reduction internal fixation right medial malleolus ankle fracture.    Interval History:  Patient is resting this morning. Moderate pain as expected. No numbness or tingling LUE, RLE. Dressing removed left wrist, island placed with volar wrist brace. Was able to work some with therapy yesterday. No complaints overall.     Review of patient's allergies indicates:   Allergen Reactions    Keppra [levetiracetam]     Nickel sutures [surgical stainless steel]     Sulfa (sulfonamide antibiotics)        Current Facility-Administered Medications   Medication    acetaminophen tablet 650 mg    albuterol inhaler 1 puff    aspirin EC tablet 81 mg    bacitracin ointment    clonazePAM tablet 1 mg    clopidogreL tablet 75 mg    docusate sodium capsule 100 mg    enoxaparin injection 40 mg    fluticasone furoate-vilanteroL 100-25 mcg/dose diskus inhaler 1 puff    hydroCHLOROthiazide tablet 12.5 mg    lithium CR tablet 450 mg    lithium CR tablet 900 mg    magnesium hydroxide 400 mg/5 ml suspension 2,400 mg    melatonin tablet 6 mg    methocarbamoL tablet 500 mg    nicotine 14 mg/24 hr 1 patch    oxyCODONE immediate release tablet 10 mg    oxyCODONE immediate release tablet 5 mg    polyethylene glycol packet 17 g    prazosin capsule 4 mg    ROPIvacaine (PF) 2 mg/ml (0.2%) solution 20 mL    vitamin D 1000 units tablet 1,000 Units     Facility-Administered  "Medications Ordered in Other Encounters   Medication    diazePAM tablet 10 mg    diphenhydrAMINE capsule 50 mg     Objective:     Vital Signs (Most Recent):  Temp: 97.6 °F (36.4 °C) (10/14/24 0729)  Pulse: 81 (10/14/24 0729)  Resp: 18 (10/14/24 0729)  BP: 132/79 (10/14/24 0729)  SpO2: 98 % (10/14/24 0729) Vital Signs (24h Range):  Temp:  [97.6 °F (36.4 °C)-99.7 °F (37.6 °C)] 97.6 °F (36.4 °C)  Pulse:  [81-91] 81  Resp:  [17-20] 18  SpO2:  [95 %-98 %] 98 %  BP: (132-147)/(79-88) 132/79     Weight: 105.2 kg (232 lb)  Height: 5' 10" (177.8 cm)  Body mass index is 33.29 kg/m².      Intake/Output Summary (Last 24 hours) at 10/14/2024 0820  Last data filed at 10/14/2024 0625  Gross per 24 hour   Intake 840 ml   Output 2300 ml   Net -1460 ml       Physical Exam:   General the patient is alert and oriented x3 no acute distress nontoxic-appearing appropriate affect.    Constitutional: Vital signs are examined and stable.  Resp: No signs of labored breathing    LUE: --Skin: Dressing removed, incision clean and dry, island and volar velcro brace ordered           -MSK: STR 5/5 AIN/PIN/Median/Radial/Ulnar motor           -Neuro:  Sensation intact to light touch C5-T1 dermatomes           -Lymphatic: No signs of lymphadenopathy, No signs of swelling,           -CV:Capillary refill is less than 2 seconds. Compartments Soft and compressible              RLE: -Skin: Dressing CDI, Cam boot in place           -MSK:  Tolerates gentle passive range of motion of the ankle, actively flexes and extends all digits and the knee           -Neuro:  Sensation intact to light touch L3-S1 dermatomes           -Lymphatic: No signs of lymphadenopathy           -CV: Capillary refill is less than 2 seconds. D people's palpable Compartments soft and compressible.        Diagnostic Findings:   Significant Labs:   Recent Lab Results  (Last 5 results in the past 72 hours)        10/14/24  0410   10/13/24  0358   10/12/24  0521   10/12/24  0315   " 10/11/24  1858        Phencyclidine         Negative       Albumin/Globulin Ratio 1.2   1.1     1.4         Neutrophils Abs Calc   15.0696             Albumin 3.0   3.2     3.3         ALP 71   79     73         ALT 13   11     12         Amphetamines, Urine         Positive       Anion Gap 8.0   10.0     7.0         Appearance, UA         Clear       AST 31   18     17         Bacteria, UA         None Seen       Barbituates, Urine         Negative       Baso # 0.05       0.04         Basophil % 0.3       0.3         Benzodiazepine, Urine         Negative       Bilirubin (UA)         Negative       BILIRUBIN TOTAL 0.8   0.5     0.7         BUN 12.2   10.3     12.1         BUN/CREAT RATIO 13   10     12         Calcium 8.4   8.9     8.6         Cannabinoids, Urine         Positive       Chloride 99   100     103         CO2 28   27     26         Cocaine, Urine         Negative       Color, UA         Light-Yellow       Creatinine 0.91   1.03     1.01         .80               eGFR >60   >60     >60         Eos # 0.47   0.3864     0.14         Eos % 3.1   2     0.9         Fentanyl, Urine         Positive       Globulin, Total 2.6   2.9     2.3         Glucose 110   106     100         Glucose, UA         Normal       Group & Rh     O POS           Hematocrit 37.2   39.9     39.6         Hemoglobin 12.3   12.8     13.0         Immature Grans (Abs) 0.19       0.18         Immature Granulocytes 1.2       1.1         Indirect Bala GEL     NEG           Ketones, UA         Negative       Leukocyte Esterase, UA         Negative       Lymph # 1.83   2.1252     2.54         LYMPH % 11.9       15.9         Lymphocyte %   11             Magnesium        2.10         MCH 28.9   28.4     28.8         MCHC 33.1   32.1     32.8         MCV 87.3   88.7     87.6         MDMA, Urine         Negative       Mono # 1.76   1.7388     1.88         Mono % 11.5   9     11.8         MPV 10.3   10.5     10.2         Mucous, UA          Trace       Neut # 11.06       11.16         Neut % 72.0       70.0         Neutrophils Relative   78             NITRITE UA         Negative       nRBC 0.0   0.0     0.0         Blood, UA         Negative       Opiates, Urine         Positive       pH, UA         6.0       pH, Urine         6.0       Phosphorus Level       4.4         Platelet Estimate   Normal             Platelet Count 264   294     270         Potassium 4.1   4.1     3.6         PROTEIN TOTAL 5.6   6.1     5.6         Protein, UA         Negative       RBC 4.26   4.50     4.52         RBC Morph   Normal             RBC, UA         0-5       RDW 13.2   13.2     13.3         Sodium 135   137     136         Specific Gravity,UA         1.044       Specific Gravity, Urine Auto         1.044       Specimen Outdate     10/15/2024 23:59           Squamous Epithelial Cells, UA         None Seen       Urobilinogen, UA         Normal       WBC, UA         0-5       WBC 15.36   19.32     15.94                                 Significant Imaging: I have reviewed and interpreted all pertinent imaging results/findings.     Assessment/Plan:     Active Diagnoses:    Diagnosis Date Noted POA    PRINCIPAL PROBLEM:  Closed fracture of left distal radius [S52.502A] 10/11/2024 Yes    Closed nondisp fracture of right medial malleolus [S82.54XD] 10/12/2024 Not Applicable    Closed right ankle fracture [S82.891A] 10/12/2024 Yes      Problems Resolved During this Admission:   Patient is a 44-year-old male postop day 1 ORIF left wrist and right ankle.   Labs are stable this morning.    Weight-bearing:  Nonweightbearing to the right lower extremity left upper extremity.  May weightbear through the left elbow for use of platform walker.  Brace placed to LUE today.  Continue multimodal pain control.  24 hours Ancef  Continue Plavix and aspirin for DVT prophylaxis during stay.  This is appropriate for discharge as well  Daily dry dressing changes to the right lower  extremity and left upper extremity beginning today and daily hereafter. No showers to POD 7. Keep clean and dry. Do not apply ointments or creams.   Regular diet.   Ortho stable for DC if able to safely mobilize. Will continue to follow peripherally. Please call with questions or concerns.       The above findings, diagnostics, and treatment plan were discussed with Dr. Barrientos who is in agreement with the plan of care except as stated in additional documentation.      Mendy Fang PA-C    Orthopedic Trauma Surgery  [facility]

## 2024-10-15 LAB
ALBUMIN SERPL-MCNC: 3.1 G/DL (ref 3.5–5)
ALBUMIN/GLOB SERPL: 1.1 RATIO (ref 1.1–2)
ALP SERPL-CCNC: 75 UNIT/L (ref 40–150)
ALT SERPL-CCNC: 17 UNIT/L (ref 0–55)
ANION GAP SERPL CALC-SCNC: 7 MEQ/L
AST SERPL-CCNC: 33 UNIT/L (ref 5–34)
BASOPHILS # BLD AUTO: 0.05 X10(3)/MCL
BASOPHILS NFR BLD AUTO: 0.4 %
BILIRUB SERPL-MCNC: 0.9 MG/DL
BUN SERPL-MCNC: 13.6 MG/DL (ref 8.9–20.6)
CALCIUM SERPL-MCNC: 8.8 MG/DL (ref 8.4–10.2)
CHLORIDE SERPL-SCNC: 96 MMOL/L (ref 98–107)
CO2 SERPL-SCNC: 31 MMOL/L (ref 22–29)
CREAT SERPL-MCNC: 0.84 MG/DL (ref 0.73–1.18)
CREAT/UREA NIT SERPL: 16
EOSINOPHIL # BLD AUTO: 0.42 X10(3)/MCL (ref 0–0.9)
EOSINOPHIL NFR BLD AUTO: 3 %
ERYTHROCYTE [DISTWIDTH] IN BLOOD BY AUTOMATED COUNT: 13.1 % (ref 11.5–17)
GFR SERPLBLD CREATININE-BSD FMLA CKD-EPI: >60 ML/MIN/1.73/M2
GLOBULIN SER-MCNC: 2.8 GM/DL (ref 2.4–3.5)
GLUCOSE SERPL-MCNC: 110 MG/DL (ref 74–100)
HCT VFR BLD AUTO: 39.2 % (ref 42–52)
HGB BLD-MCNC: 13 G/DL (ref 14–18)
IMM GRANULOCYTES # BLD AUTO: 0.19 X10(3)/MCL (ref 0–0.04)
IMM GRANULOCYTES NFR BLD AUTO: 1.3 %
LITHIUM SERPL-MCNC: 0.64 MMOL/L (ref 0.5–1.2)
LYMPHOCYTES # BLD AUTO: 1.51 X10(3)/MCL (ref 0.6–4.6)
LYMPHOCYTES NFR BLD AUTO: 10.6 %
MCH RBC QN AUTO: 28.9 PG (ref 27–31)
MCHC RBC AUTO-ENTMCNC: 33.2 G/DL (ref 33–36)
MCV RBC AUTO: 87.1 FL (ref 80–94)
MONOCYTES # BLD AUTO: 1.82 X10(3)/MCL (ref 0.1–1.3)
MONOCYTES NFR BLD AUTO: 12.8 %
NEUTROPHILS # BLD AUTO: 10.23 X10(3)/MCL (ref 2.1–9.2)
NEUTROPHILS NFR BLD AUTO: 71.9 %
NRBC BLD AUTO-RTO: 0 %
PLATELET # BLD AUTO: 300 X10(3)/MCL (ref 130–400)
PMV BLD AUTO: 10.3 FL (ref 7.4–10.4)
POTASSIUM SERPL-SCNC: 4 MMOL/L (ref 3.5–5.1)
PROT SERPL-MCNC: 5.9 GM/DL (ref 6.4–8.3)
RBC # BLD AUTO: 4.5 X10(6)/MCL (ref 4.7–6.1)
SODIUM SERPL-SCNC: 134 MMOL/L (ref 136–145)
WBC # BLD AUTO: 14.22 X10(3)/MCL (ref 4.5–11.5)

## 2024-10-15 PROCEDURE — 97530 THERAPEUTIC ACTIVITIES: CPT

## 2024-10-15 PROCEDURE — 94660 CPAP INITIATION&MGMT: CPT

## 2024-10-15 PROCEDURE — 25000242 PHARM REV CODE 250 ALT 637 W/ HCPCS

## 2024-10-15 PROCEDURE — 25000003 PHARM REV CODE 250

## 2024-10-15 PROCEDURE — 99900031 HC PATIENT EDUCATION (STAT)

## 2024-10-15 PROCEDURE — 97110 THERAPEUTIC EXERCISES: CPT

## 2024-10-15 PROCEDURE — 80178 ASSAY OF LITHIUM: CPT | Performed by: NURSE PRACTITIONER

## 2024-10-15 PROCEDURE — 27100171 HC OXYGEN HIGH FLOW UP TO 24 HOURS

## 2024-10-15 PROCEDURE — 97166 OT EVAL MOD COMPLEX 45 MIN: CPT

## 2024-10-15 PROCEDURE — 63600175 PHARM REV CODE 636 W HCPCS

## 2024-10-15 PROCEDURE — 80053 COMPREHEN METABOLIC PANEL: CPT

## 2024-10-15 PROCEDURE — 94799 UNLISTED PULMONARY SVC/PX: CPT

## 2024-10-15 PROCEDURE — 11000001 HC ACUTE MED/SURG PRIVATE ROOM

## 2024-10-15 PROCEDURE — S4991 NICOTINE PATCH NONLEGEND: HCPCS

## 2024-10-15 PROCEDURE — 94760 N-INVAS EAR/PLS OXIMETRY 1: CPT

## 2024-10-15 PROCEDURE — 85025 COMPLETE CBC W/AUTO DIFF WBC: CPT

## 2024-10-15 PROCEDURE — 99900035 HC TECH TIME PER 15 MIN (STAT)

## 2024-10-15 PROCEDURE — 36415 COLL VENOUS BLD VENIPUNCTURE: CPT

## 2024-10-15 RX ADMIN — HYDROCHLOROTHIAZIDE 12.5 MG: 12.5 TABLET ORAL at 08:10

## 2024-10-15 RX ADMIN — METHOCARBAMOL 500 MG: 500 TABLET ORAL at 09:10

## 2024-10-15 RX ADMIN — OXYCODONE HYDROCHLORIDE 5 MG: 5 TABLET ORAL at 11:10

## 2024-10-15 RX ADMIN — NICOTINE 1 PATCH: 14 PATCH TRANSDERMAL at 09:10

## 2024-10-15 RX ADMIN — ENOXAPARIN SODIUM 40 MG: 40 INJECTION SUBCUTANEOUS at 09:10

## 2024-10-15 RX ADMIN — LITHIUM CARBONATE 900 MG: 450 TABLET, EXTENDED RELEASE ORAL at 09:10

## 2024-10-15 RX ADMIN — OXYCODONE HYDROCHLORIDE 10 MG: 5 TABLET ORAL at 10:10

## 2024-10-15 RX ADMIN — METHOCARBAMOL 500 MG: 500 TABLET ORAL at 05:10

## 2024-10-15 RX ADMIN — POLYETHYLENE GLYCOL 3350 17 GRAM ORAL POWDER PACKET 17 G: at 09:10

## 2024-10-15 RX ADMIN — METHOCARBAMOL 500 MG: 500 TABLET ORAL at 03:10

## 2024-10-15 RX ADMIN — BACITRACIN: 500 OINTMENT TOPICAL at 03:10

## 2024-10-15 RX ADMIN — OXYCODONE HYDROCHLORIDE 10 MG: 5 TABLET ORAL at 04:10

## 2024-10-15 RX ADMIN — LITHIUM CARBONATE 450 MG: 450 TABLET, EXTENDED RELEASE ORAL at 08:10

## 2024-10-15 RX ADMIN — DOCUSATE SODIUM 100 MG: 100 CAPSULE, LIQUID FILLED ORAL at 09:10

## 2024-10-15 RX ADMIN — DOCUSATE SODIUM 100 MG: 100 CAPSULE, LIQUID FILLED ORAL at 08:10

## 2024-10-15 RX ADMIN — ASPIRIN 81 MG: 81 TABLET, COATED ORAL at 08:10

## 2024-10-15 RX ADMIN — POLYETHYLENE GLYCOL 3350 17 GRAM ORAL POWDER PACKET 17 G: at 08:10

## 2024-10-15 RX ADMIN — PRAZOSIN HYDROCHLORIDE 4 MG: 2 CAPSULE ORAL at 09:10

## 2024-10-15 RX ADMIN — ACETAMINOPHEN 325MG 650 MG: 325 TABLET ORAL at 09:10

## 2024-10-15 RX ADMIN — BACITRACIN: 500 OINTMENT TOPICAL at 09:10

## 2024-10-15 RX ADMIN — CLOPIDOGREL BISULFATE 75 MG: 75 TABLET ORAL at 08:10

## 2024-10-15 RX ADMIN — ACETAMINOPHEN 325MG 650 MG: 325 TABLET ORAL at 05:10

## 2024-10-15 RX ADMIN — MAGNESIUM HYDROXIDE 2400 MG: 400 SUSPENSION ORAL at 04:10

## 2024-10-15 RX ADMIN — FLUTICASONE FUROATE AND VILANTEROL TRIFENATATE 1 PUFF: 100; 25 POWDER RESPIRATORY (INHALATION) at 09:10

## 2024-10-15 RX ADMIN — Medication 1000 UNITS: at 08:10

## 2024-10-15 RX ADMIN — ACETAMINOPHEN 325MG 650 MG: 325 TABLET ORAL at 03:10
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
## 2024-10-15 NOTE — PLAN OF CARE
updates me that pt is set to go to [facility] rehab tomorrow if he has a BM. Nursing is working on this. Communication sent to trauma
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
## 2024-10-15 NOTE — PLAN OF CARE
Problem: Occupational Therapy  Goal: Occupational Therapy Goal  Description: Goals to be met by: 11/12/24     Patient will increase functional independence with ADLs by performing:    UE Dressing with Modified [place].  LE Dressing with Modified [place].  Grooming while seated with Modified [place].  Toileting from bedside commode with Modified [place] for hygiene and clothing management.   Toilet transfer to bedside commode with Modified [place]. Progress as appropriate.     Outcome: Progressing

## 2024-10-15 NOTE — PT/OT/SLP PROGRESS
Physical Therapy Treatment    Patient Name:  Edward Wilks   MRN:  [id number]    Recommendations:     Discharge therapy intensity: High Intensity Therapy   Discharge Equipment Recommendations:  (TBD by next level of care)  Barriers to discharge: Impaired mobility    Assessment:     Edward Wilks is a 44 y.o. male admitted with a medical diagnosis of correction: L distal radius fx s/p ORIF, R medial malleolar fx s/p ORIF, nasal bone fx (non-op).  He presents with the following impairments/functional limitations: weakness, impaired endurance, impaired self care skills, impaired functional mobility, gait instability, impaired balance, decreased upper extremity function, decreased lower extremity function, decreased safety awareness, pain.    Rehab Prognosis: Good; patient would benefit from acute skilled PT services to address these deficits and reach maximum level of function.    Recent Surgery: Procedure(s) (LRB):  ORIF, FRACTURE, RADIUS, DISTAL (Left)  ORIF, ANKLE (Right) 3 Days Post-Op    Plan:     During this hospitalization, patient would benefit from acute PT services 6 x/week to address the identified rehab impairments via gait training, therapeutic activities, therapeutic exercises, neuromuscular re-education and progress toward the following goals:    Plan of Care Expires:  11/13/24    Subjective     Chief Complaint: pain  Patient/Family Comments/goals: none  Pain/Comfort:  Pain Rating 1: 7/10  Location 1:  (left forearm, right ankle, right side of mid back)  Pain Addressed 1: Distraction, Reposition  Pain Rating Post-Intervention 1: 7/10      Objective:     Communicated with nurse prior to session.  Patient found supine with telemetry (CAM boot, wrist brace) upon PT entry to room.     General Precautions: Standard, fall  Orthopedic Precautions: RLE non weight bearing, LUE non weight bearing  Braces:  (R CAM boot, L wrist splint)  Respiratory Status: Room air  Skin Integrity: Visible skin  intact      Functional Mobility:  Bed Mobility:  Supine to Sit: stand by assistance  Transfers:  Sit to Stand:  moderate assistance with platform walker  Gait: 8 ft + 10 ft + 6 ft with PFRW & MIN A. Cueing to maintain WB precautions. Seated rest breaks  Balance: fair/poor    Therapeutic Exercises:  Patient performed seated Marches, Heel Raises, LAQ, Glute Sets, Resisted Hip ABD/ADD. All performed 2 x 20 reps.    Education Provided:  Role and goals of PT, transfer training, bed mobility, gait training, balance training, safety awareness, assistive device, strengthening exercises, and importance of participating in PT to return to PLOF.    Patient left up in chair with all lines intact, call button in reach, and educated on calling for assistance when ready to return to bed    GOALS:   Multidisciplinary Problems       Physical Therapy Goals          Problem: Physical Therapy    Goal Priority Disciplines Outcome Interventions   Physical Therapy Goal     PT, PT/OT Progressing    Description: Goals to be met by: 11/13/24     Patient will increase functional independence with mobility by performing:    Supine to sit with [place]  Sit to stand transfer with Modified [place]  Gait  x 200 feet with Modified [place] using Rolling Walker with platform attachment.                          Time Tracking:     PT Received On: 10/15/24  PT Start Time: 1244     PT Stop Time: 1308  PT Total Time (min): 24 min     Billable Minutes: Therapeutic Activity 14 minutes and Therapeutic Exercise 10 minutes    Treatment Type: Treatment  PT/PTA: PT     Number of PTA visits since last PT visit: 2     10/15/2024
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
## 2024-10-15 NOTE — PLAN OF CARE
Problem: Adult Inpatient Plan of Care  Goal: Plan of Care Review  Outcome: Progressing  Flowsheets (Taken 10/15/2024 0557)  Plan of Care Reviewed With: patient  Goal: Patient-Specific Goal (Individualized)  Outcome: Progressing  Goal: Absence of Hospital-Acquired Illness or Injury  Outcome: Progressing  Goal: Optimal Comfort and Wellbeing  Outcome: Progressing  Goal: Readiness for Transition of Care  Outcome: Progressing     Problem: Fall Injury Risk  Goal: Absence of Fall and Fall-Related Injury  Outcome: Progressing     Problem: Orthopaedic Fracture  Goal: Absence of Bleeding  Outcome: Progressing  Goal: Bowel Elimination  Outcome: Progressing  Goal: Absence of Embolism Signs and Symptoms  Outcome: Progressing  Goal: Fracture Stability  Outcome: Progressing  Goal: Optimal Functional Ability  Outcome: Progressing  Goal: Absence of Infection Signs and Symptoms  Outcome: Progressing  Goal: Effective Tissue Perfusion  Outcome: Progressing  Goal: Optimal Pain Control and Function  Outcome: Progressing  Goal: Effective Oxygenation and Ventilation  Outcome: Progressing     Problem: Wound  Goal: Optimal Coping  Outcome: Progressing  Goal: Optimal Functional Ability  Outcome: Progressing  Goal: Absence of Infection Signs and Symptoms  Outcome: Progressing  Goal: Improved Oral Intake  Outcome: Progressing  Goal: Optimal Pain Control and Function  Outcome: Progressing  Goal: Skin Health and Integrity  Outcome: Progressing  Goal: Optimal Wound Healing  Outcome: Progressing

## 2024-10-16 LAB
ABS NEUT CALC (OHS): 12.81 X10(3)/MCL (ref 2.1–9.2)
ALBUMIN SERPL-MCNC: 3 G/DL (ref 3.5–5)
ALBUMIN/GLOB SERPL: 0.8 RATIO (ref 1.1–2)
ALP SERPL-CCNC: 80 UNIT/L (ref 40–150)
ALT SERPL-CCNC: 21 UNIT/L (ref 0–55)
ANION GAP SERPL CALC-SCNC: 9 MEQ/L
AST SERPL-CCNC: 34 UNIT/L (ref 5–34)
BILIRUB SERPL-MCNC: 0.8 MG/DL
BUN SERPL-MCNC: 19.2 MG/DL (ref 8.9–20.6)
CALCIUM SERPL-MCNC: 9.5 MG/DL (ref 8.4–10.2)
CHLORIDE SERPL-SCNC: 96 MMOL/L (ref 98–107)
CO2 SERPL-SCNC: 28 MMOL/L (ref 22–29)
CREAT SERPL-MCNC: 0.89 MG/DL (ref 0.73–1.18)
CREAT/UREA NIT SERPL: 22
ERYTHROCYTE [DISTWIDTH] IN BLOOD BY AUTOMATED COUNT: 12.8 % (ref 11.5–17)
GFR SERPLBLD CREATININE-BSD FMLA CKD-EPI: >60 ML/MIN/1.73/M2
GLOBULIN SER-MCNC: 3.6 GM/DL (ref 2.4–3.5)
GLUCOSE SERPL-MCNC: 118 MG/DL (ref 74–100)
HCT VFR BLD AUTO: 39.6 % (ref 42–52)
HGB BLD-MCNC: 13.1 G/DL (ref 14–18)
LYMPHOCYTES NFR BLD MANUAL: 1.41 X10(3)/MCL
LYMPHOCYTES NFR BLD MANUAL: 9 % (ref 13–40)
MCH RBC QN AUTO: 28.4 PG (ref 27–31)
MCHC RBC AUTO-ENTMCNC: 33.1 G/DL (ref 33–36)
MCV RBC AUTO: 85.7 FL (ref 80–94)
MONOCYTES NFR BLD MANUAL: 1.41 X10(3)/MCL (ref 0.1–1.3)
MONOCYTES NFR BLD MANUAL: 9 % (ref 2–11)
NEUTROPHILS NFR BLD MANUAL: 82 % (ref 47–80)
NRBC BLD AUTO-RTO: 0 %
PLATELET # BLD AUTO: 362 X10(3)/MCL (ref 130–400)
PLATELET # BLD EST: NORMAL 10*3/UL
PMV BLD AUTO: 10.6 FL (ref 7.4–10.4)
POTASSIUM SERPL-SCNC: 4.1 MMOL/L (ref 3.5–5.1)
PROT SERPL-MCNC: 6.6 GM/DL (ref 6.4–8.3)
RBC # BLD AUTO: 4.62 X10(6)/MCL (ref 4.7–6.1)
RBC MORPH BLD: NORMAL
SODIUM SERPL-SCNC: 133 MMOL/L (ref 136–145)
WBC # BLD AUTO: 15.62 X10(3)/MCL (ref 4.5–11.5)

## 2024-10-16 PROCEDURE — 85025 COMPLETE CBC W/AUTO DIFF WBC: CPT

## 2024-10-16 PROCEDURE — 25000003 PHARM REV CODE 250

## 2024-10-16 PROCEDURE — 36415 COLL VENOUS BLD VENIPUNCTURE: CPT

## 2024-10-16 PROCEDURE — 11000001 HC ACUTE MED/SURG PRIVATE ROOM

## 2024-10-16 PROCEDURE — 97116 GAIT TRAINING THERAPY: CPT

## 2024-10-16 PROCEDURE — 27100171 HC OXYGEN HIGH FLOW UP TO 24 HOURS

## 2024-10-16 PROCEDURE — 99900035 HC TECH TIME PER 15 MIN (STAT)

## 2024-10-16 PROCEDURE — 94760 N-INVAS EAR/PLS OXIMETRY 1: CPT

## 2024-10-16 PROCEDURE — 97535 SELF CARE MNGMENT TRAINING: CPT

## 2024-10-16 PROCEDURE — 97110 THERAPEUTIC EXERCISES: CPT

## 2024-10-16 PROCEDURE — 99900031 HC PATIENT EDUCATION (STAT)

## 2024-10-16 PROCEDURE — 80053 COMPREHEN METABOLIC PANEL: CPT

## 2024-10-16 PROCEDURE — 25000242 PHARM REV CODE 250 ALT 637 W/ HCPCS

## 2024-10-16 PROCEDURE — 63600175 PHARM REV CODE 636 W HCPCS

## 2024-10-16 PROCEDURE — S4991 NICOTINE PATCH NONLEGEND: HCPCS

## 2024-10-16 PROCEDURE — 99232 SBSQ HOSP IP/OBS MODERATE 35: CPT | Mod: GC,,, | Performed by: SURGERY

## 2024-10-16 PROCEDURE — 94660 CPAP INITIATION&MGMT: CPT

## 2024-10-16 RX ORDER — PROMETHAZINE HYDROCHLORIDE 12.5 MG/1
12.5 TABLET ORAL EVERY 6 HOURS PRN
Status: DISCONTINUED | OUTPATIENT
Start: 2024-10-16 | End: 2024-10-17 | Stop reason: HOSPADM

## 2024-10-16 RX ORDER — BISACODYL 10 MG/1
10 SUPPOSITORY RECTAL ONCE
Status: COMPLETED | OUTPATIENT
Start: 2024-10-16 | End: 2024-10-16

## 2024-10-16 RX ORDER — TRAMADOL HYDROCHLORIDE 50 MG/1
50 TABLET ORAL EVERY 6 HOURS PRN
Status: DISCONTINUED | OUTPATIENT
Start: 2024-10-16 | End: 2024-10-17 | Stop reason: HOSPADM

## 2024-10-16 RX ORDER — AMOXICILLIN 250 MG
1 CAPSULE ORAL 2 TIMES DAILY
Status: DISCONTINUED | OUTPATIENT
Start: 2024-10-16 | End: 2024-10-17 | Stop reason: HOSPADM

## 2024-10-16 RX ORDER — MELOXICAM 7.5 MG/1
7.5 TABLET ORAL DAILY
Status: DISCONTINUED | OUTPATIENT
Start: 2024-10-16 | End: 2024-10-17 | Stop reason: HOSPADM

## 2024-10-16 RX ORDER — ACETAMINOPHEN 325 MG/1
650 TABLET ORAL EVERY 6 HOURS
Status: DISCONTINUED | OUTPATIENT
Start: 2024-10-17 | End: 2024-10-17 | Stop reason: HOSPADM

## 2024-10-16 RX ORDER — ONDANSETRON 4 MG/1
4 TABLET, ORALLY DISINTEGRATING ORAL EVERY 6 HOURS PRN
Status: DISCONTINUED | OUTPATIENT
Start: 2024-10-16 | End: 2024-10-17 | Stop reason: HOSPADM

## 2024-10-16 RX ADMIN — Medication 1000 UNITS: at 10:10

## 2024-10-16 RX ADMIN — METHOCARBAMOL 500 MG: 500 TABLET ORAL at 05:10

## 2024-10-16 RX ADMIN — ENOXAPARIN SODIUM 40 MG: 40 INJECTION SUBCUTANEOUS at 10:10

## 2024-10-16 RX ADMIN — POLYETHYLENE GLYCOL 3350 17 GRAM ORAL POWDER PACKET 17 G: at 08:10

## 2024-10-16 RX ADMIN — SENNOSIDES AND DOCUSATE SODIUM 1 TABLET: 50; 8.6 TABLET ORAL at 08:10

## 2024-10-16 RX ADMIN — TRAMADOL HYDROCHLORIDE 50 MG: 50 TABLET, COATED ORAL at 03:10

## 2024-10-16 RX ADMIN — CLOPIDOGREL BISULFATE 75 MG: 75 TABLET ORAL at 10:10

## 2024-10-16 RX ADMIN — NICOTINE 1 PATCH: 14 PATCH TRANSDERMAL at 08:10

## 2024-10-16 RX ADMIN — OXYCODONE HYDROCHLORIDE 5 MG: 5 TABLET ORAL at 02:10

## 2024-10-16 RX ADMIN — PRAZOSIN HYDROCHLORIDE 4 MG: 2 CAPSULE ORAL at 08:10

## 2024-10-16 RX ADMIN — DOCUSATE SODIUM 100 MG: 100 CAPSULE, LIQUID FILLED ORAL at 10:10

## 2024-10-16 RX ADMIN — POLYETHYLENE GLYCOL 3350 17 GRAM ORAL POWDER PACKET 17 G: at 10:10

## 2024-10-16 RX ADMIN — LITHIUM CARBONATE 450 MG: 450 TABLET, EXTENDED RELEASE ORAL at 10:10

## 2024-10-16 RX ADMIN — BACITRACIN: 500 OINTMENT TOPICAL at 09:10

## 2024-10-16 RX ADMIN — LITHIUM CARBONATE 900 MG: 450 TABLET, EXTENDED RELEASE ORAL at 08:10

## 2024-10-16 RX ADMIN — FLUTICASONE FUROATE AND VILANTEROL TRIFENATATE 1 PUFF: 100; 25 POWDER RESPIRATORY (INHALATION) at 10:10

## 2024-10-16 RX ADMIN — BISACODYL 10 MG: 10 SUPPOSITORY RECTAL at 06:10

## 2024-10-16 RX ADMIN — ENOXAPARIN SODIUM 40 MG: 40 INJECTION SUBCUTANEOUS at 08:10

## 2024-10-16 RX ADMIN — OXYCODONE HYDROCHLORIDE 5 MG: 5 TABLET ORAL at 07:10

## 2024-10-16 RX ADMIN — HYDROCHLOROTHIAZIDE 12.5 MG: 12.5 TABLET ORAL at 10:10

## 2024-10-16 RX ADMIN — BACITRACIN: 500 OINTMENT TOPICAL at 02:10

## 2024-10-16 RX ADMIN — BACITRACIN: 500 OINTMENT TOPICAL at 10:10

## 2024-10-16 RX ADMIN — ACETAMINOPHEN 325MG 650 MG: 325 TABLET ORAL at 05:10

## 2024-10-16 RX ADMIN — ASPIRIN 81 MG: 81 TABLET, COATED ORAL at 10:10

## 2024-10-16 RX ADMIN — ACETAMINOPHEN 325MG 650 MG: 325 TABLET ORAL at 10:10
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
## 2024-10-16 NOTE — PLAN OF CARE
CONSULT FOR DEPRESSION/POSITIVE ITSS SCREEN PER TRAUMA TEAM.  Notified [facility]/Chanell of above info.  Will see Psych for eval.

## 2024-10-17 VITALS
SYSTOLIC BLOOD PRESSURE: 137 MMHG | WEIGHT: 232 LBS | DIASTOLIC BLOOD PRESSURE: 85 MMHG | HEART RATE: 97 BPM | OXYGEN SATURATION: 97 % | TEMPERATURE: 98 F | RESPIRATION RATE: 18 BRPM | BODY MASS INDEX: 33.21 KG/M2 | HEIGHT: 70 IN

## 2024-10-17 PROBLEM — S82.891A CLOSED RIGHT ANKLE FRACTURE: Status: RESOLVED | Noted: 2024-10-12 | Resolved: 2024-10-17

## 2024-10-17 LAB
ABS NEUT (OLG): 10.88 X10(3)/MCL (ref 2.1–9.2)
ALBUMIN SERPL-MCNC: 3.1 G/DL (ref 3.5–5)
ALBUMIN/GLOB SERPL: 0.8 RATIO (ref 1.1–2)
ALP SERPL-CCNC: 82 UNIT/L (ref 40–150)
ALT SERPL-CCNC: 23 UNIT/L (ref 0–55)
ANION GAP SERPL CALC-SCNC: 8 MEQ/L
AST SERPL-CCNC: 30 UNIT/L (ref 5–34)
BILIRUB SERPL-MCNC: 0.9 MG/DL
BUN SERPL-MCNC: 17.8 MG/DL (ref 8.9–20.6)
CALCIUM SERPL-MCNC: 9.4 MG/DL (ref 8.4–10.2)
CHLORIDE SERPL-SCNC: 94 MMOL/L (ref 98–107)
CO2 SERPL-SCNC: 30 MMOL/L (ref 22–29)
CREAT SERPL-MCNC: 0.87 MG/DL (ref 0.73–1.18)
CREAT/UREA NIT SERPL: 20
CRP SERPL-MCNC: 96 MG/L
EOSINOPHIL NFR BLD MANUAL: 0.44 X10(3)/MCL (ref 0–0.9)
EOSINOPHIL NFR BLD MANUAL: 3 %
ERYTHROCYTE [DISTWIDTH] IN BLOOD BY AUTOMATED COUNT: 12.7 % (ref 11.5–17)
GFR SERPLBLD CREATININE-BSD FMLA CKD-EPI: >60 ML/MIN/1.73/M2
GLOBULIN SER-MCNC: 3.7 GM/DL (ref 2.4–3.5)
GLUCOSE SERPL-MCNC: 108 MG/DL (ref 74–100)
HCT VFR BLD AUTO: 38.1 % (ref 42–52)
HGB BLD-MCNC: 12.8 G/DL (ref 14–18)
INSTRUMENT WBC (OLG): 14.7 X10(3)/MCL
LYMPHOCYTES NFR BLD MANUAL: 17 %
LYMPHOCYTES NFR BLD MANUAL: 2.5 X10(3)/MCL
MCH RBC QN AUTO: 28.7 PG (ref 27–31)
MCHC RBC AUTO-ENTMCNC: 33.6 G/DL (ref 33–36)
MCV RBC AUTO: 85.4 FL (ref 80–94)
MONOCYTES NFR BLD MANUAL: 1.03 X10(3)/MCL (ref 0.1–1.3)
MONOCYTES NFR BLD MANUAL: 7 %
NEUTROPHILS NFR BLD MANUAL: 74 %
NRBC BLD AUTO-RTO: 0 %
PLATELET # BLD AUTO: 411 X10(3)/MCL (ref 130–400)
PLATELET # BLD EST: ABNORMAL 10*3/UL
PMV BLD AUTO: 10.1 FL (ref 7.4–10.4)
POTASSIUM SERPL-SCNC: 3.8 MMOL/L (ref 3.5–5.1)
PREALB SERPL-MCNC: 18.4 MG/DL (ref 18–45)
PROT SERPL-MCNC: 6.8 GM/DL (ref 6.4–8.3)
RBC # BLD AUTO: 4.46 X10(6)/MCL (ref 4.7–6.1)
RBC MORPH BLD: NORMAL
SODIUM SERPL-SCNC: 132 MMOL/L (ref 136–145)
WBC # BLD AUTO: 14.7 X10(3)/MCL (ref 4.5–11.5)

## 2024-10-17 PROCEDURE — 84134 ASSAY OF PREALBUMIN: CPT

## 2024-10-17 PROCEDURE — 25000003 PHARM REV CODE 250: Performed by: NURSE PRACTITIONER

## 2024-10-17 PROCEDURE — 25000003 PHARM REV CODE 250

## 2024-10-17 PROCEDURE — 86140 C-REACTIVE PROTEIN: CPT

## 2024-10-17 PROCEDURE — 94799 UNLISTED PULMONARY SVC/PX: CPT

## 2024-10-17 PROCEDURE — 85025 COMPLETE CBC W/AUTO DIFF WBC: CPT

## 2024-10-17 PROCEDURE — 99238 HOSP IP/OBS DSCHRG MGMT 30/<: CPT | Mod: GC,,, | Performed by: SURGERY

## 2024-10-17 PROCEDURE — 80053 COMPREHEN METABOLIC PANEL: CPT

## 2024-10-17 PROCEDURE — 36415 COLL VENOUS BLD VENIPUNCTURE: CPT

## 2024-10-17 PROCEDURE — 85027 COMPLETE CBC AUTOMATED: CPT

## 2024-10-17 PROCEDURE — 94760 N-INVAS EAR/PLS OXIMETRY 1: CPT

## 2024-10-17 PROCEDURE — 63600175 PHARM REV CODE 636 W HCPCS

## 2024-10-17 PROCEDURE — 25000242 PHARM REV CODE 250 ALT 637 W/ HCPCS

## 2024-10-17 PROCEDURE — 27000221 HC OXYGEN, UP TO 24 HOURS

## 2024-10-17 RX ORDER — BACITRACIN 500 [USP'U]/G
OINTMENT TOPICAL 3 TIMES DAILY
Qty: 14 G | Refills: 0 | Status: SHIPPED | OUTPATIENT
Start: 2024-10-17

## 2024-10-17 RX ORDER — DOCUSATE SODIUM 100 MG
300 CAPSULE ORAL
Status: DISCONTINUED | OUTPATIENT
Start: 2024-10-17 | End: 2024-10-17 | Stop reason: HOSPADM

## 2024-10-17 RX ORDER — HYDROCODONE BITARTRATE AND ACETAMINOPHEN 5; 325 MG/1; MG/1
1 TABLET ORAL EVERY 6 HOURS PRN
Qty: 15 TABLET | Refills: 0 | Status: SHIPPED | OUTPATIENT
Start: 2024-10-17

## 2024-10-17 RX ORDER — MELOXICAM 7.5 MG/1
7.5 TABLET ORAL DAILY
Qty: 5 TABLET | Refills: 0 | Status: SHIPPED | OUTPATIENT
Start: 2024-10-17 | End: 2024-10-22

## 2024-10-17 RX ADMIN — ACETAMINOPHEN 650 MG: 325 TABLET, FILM COATED ORAL at 06:10

## 2024-10-17 RX ADMIN — MELOXICAM 7.5 MG: 7.5 TABLET ORAL at 12:10

## 2024-10-17 RX ADMIN — ASPIRIN 81 MG: 81 TABLET, COATED ORAL at 09:10

## 2024-10-17 RX ADMIN — HYDROCHLOROTHIAZIDE 12.5 MG: 12.5 TABLET ORAL at 09:10

## 2024-10-17 RX ADMIN — SENNOSIDES AND DOCUSATE SODIUM 1 TABLET: 50; 8.6 TABLET ORAL at 09:10

## 2024-10-17 RX ADMIN — MELOXICAM 7.5 MG: 7.5 TABLET ORAL at 09:10

## 2024-10-17 RX ADMIN — CLOPIDOGREL BISULFATE 75 MG: 75 TABLET ORAL at 09:10

## 2024-10-17 RX ADMIN — BACITRACIN: 500 OINTMENT TOPICAL at 10:10

## 2024-10-17 RX ADMIN — Medication 1000 UNITS: at 09:10

## 2024-10-17 RX ADMIN — ENOXAPARIN SODIUM 40 MG: 40 INJECTION SUBCUTANEOUS at 09:10

## 2024-10-17 RX ADMIN — LITHIUM CARBONATE 450 MG: 450 TABLET, EXTENDED RELEASE ORAL at 09:10

## 2024-10-17 RX ADMIN — ONDANSETRON 4 MG: 4 TABLET, ORALLY DISINTEGRATING ORAL at 10:10

## 2024-10-17 RX ADMIN — FLUTICASONE FUROATE AND VILANTEROL TRIFENATATE 1 PUFF: 100; 25 POWDER RESPIRATORY (INHALATION) at 10:10

## 2024-10-17 NOTE — PLAN OF CARE
Problem: Adult Inpatient Plan of Care  Goal: Plan of Care Review  10/17/2024 1218 by Tha, Amy, LPN  Outcome: Progressing  10/17/2024 1217 by Tha, Amy, LPN  Outcome: Progressing  Goal: Patient-Specific Goal (Individualized)  10/17/2024 1218 by Tha, Amy, LPN  Outcome: Progressing  10/17/2024 1217 by Tha, Amy, LPN  Outcome: Progressing  Goal: Absence of Hospital-Acquired Illness or Injury  10/17/2024 1218 by Tha, Amy, LPN  Outcome: Progressing  10/17/2024 1217 by Tha Amy, LPN  Outcome: Progressing  Goal: Optimal Comfort and Wellbeing  10/17/2024 1218 by Tha, Amy, LPN  Outcome: Progressing  10/17/2024 1217 by Tha, Amy, LPN  Outcome: Progressing  Goal: Readiness for Transition of Care  10/17/2024 1218 by Tha Amy, LPN  Outcome: Progressing  10/17/2024 1217 by Tah Amy, LPN  Outcome: Progressing     Problem: Fall Injury Risk  Goal: Absence of Fall and Fall-Related Injury  10/17/2024 1218 by Tha, Amy, LPN  Outcome: Progressing  10/17/2024 1217 by Tha Amy, LPN  Outcome: Progressing     Problem: Orthopaedic Fracture  Goal: Absence of Bleeding  10/17/2024 1218 by Tha, Amy, LPN  Outcome: Progressing  10/17/2024 1217 by Tha, Amy, LPN  Outcome: Progressing  Goal: Bowel Elimination  10/17/2024 1218 by Tha Amy, LPN  Outcome: Progressing  10/17/2024 1217 by Tha Amy, LPN  Outcome: Progressing  Goal: Absence of Embolism Signs and Symptoms  10/17/2024 1218 by Tha Amy, LPN  Outcome: Progressing  10/17/2024 1217 by Tha Amy, LPN  Outcome: Progressing  Goal: Fracture Stability  10/17/2024 1218 by Tha Amy, LPN  Outcome: Progressing  10/17/2024 1217 by Tha, Amy, LPN  Outcome: Progressing  Goal: Optimal Functional Ability  10/17/2024 1218 by Tha, Amy, LPN  Outcome: Progressing  10/17/2024 1217 by Amy Almazan LPN  Outcome: Progressing  Goal: Absence of Infection Signs and Symptoms  10/17/2024 1218 by Amy Almazan LPN  Outcome:  Progressing  10/17/2024 1217 by Tha, Amy, LPN  Outcome: Progressing  Goal: Effective Tissue Perfusion  10/17/2024 1218 by Tha, Amy, LPN  Outcome: Progressing  10/17/2024 1217 by Tha, Amy, LPN  Outcome: Progressing  Goal: Optimal Pain Control and Function  10/17/2024 1218 by Tha, Amy, LPN  Outcome: Progressing  10/17/2024 1217 by Tha, Amy, LPN  Outcome: Progressing  Goal: Effective Oxygenation and Ventilation  10/17/2024 1218 by Tha, Amy, LPN  Outcome: Progressing  10/17/2024 1217 by Tha, Amy, LPN  Outcome: Progressing     Problem: Wound  Goal: Optimal Coping  10/17/2024 1218 by Tha, Amy, LPN  Outcome: Progressing  10/17/2024 1217 by Tha, Amy, LPN  Outcome: Progressing  Goal: Optimal Functional Ability  10/17/2024 1218 by Tha, Amy, LPN  Outcome: Progressing  10/17/2024 1217 by Tha, Amy, LPN  Outcome: Progressing  Goal: Absence of Infection Signs and Symptoms  10/17/2024 1218 by Tha, Amy, LPN  Outcome: Progressing  10/17/2024 1217 by Tha, Amy, LPN  Outcome: Progressing  Goal: Improved Oral Intake  10/17/2024 1218 by Tha, Amy, LPN  Outcome: Progressing  10/17/2024 1217 by Tha, Amy, LPN  Outcome: Progressing  Goal: Optimal Pain Control and Function  10/17/2024 1218 by Tha, Amy, LPN  Outcome: Progressing  10/17/2024 1217 by Tha, Amy, LPN  Outcome: Progressing  Goal: Skin Health and Integrity  10/17/2024 1218 by Tha, Amy, LPN  Outcome: Progressing  10/17/2024 1217 by Tha, Amy, LPN  Outcome: Progressing  Goal: Optimal Wound Healing  10/17/2024 1218 by Tha, Amy, LPN  Outcome: Progressing  10/17/2024 1217 by Tha, Aym, LPN  Outcome: Progressing

## 2024-10-17 NOTE — NURSING
[facility] needing a post-discharge mental health referral.
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
## 2024-10-17 NOTE — PT/OT/SLP PROGRESS
Attempted OT session this AM. Pt awaiting t/f to rehab and politely declined to participate. Reported no needs or concerns prior to d/c.
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
## 2024-11-07 ENCOUNTER — OFFICE VISIT (OUTPATIENT)
Dept: ORTHOPEDICS | Facility: CLINIC | Age: 44
End: 2024-11-07
Payer: MEDICARE

## 2024-11-07 ENCOUNTER — HOSPITAL ENCOUNTER (OUTPATIENT)
Dept: RADIOLOGY | Facility: CLINIC | Age: 44
Discharge: HOME | End: 2024-11-07
Attending: PHYSICIAN ASSISTANT
Payer: MEDICARE

## 2024-11-07 VITALS
BODY MASS INDEX: 33.14 KG/M2 | SYSTOLIC BLOOD PRESSURE: 121 MMHG | HEIGHT: 70 IN | WEIGHT: 231.5 LBS | HEART RATE: 103 BPM | DIASTOLIC BLOOD PRESSURE: 83 MMHG

## 2024-11-07 DIAGNOSIS — S82.54XD CLOSED NONDISP FRACTURE OF RIGHT MEDIAL MALLEOLUS WITH ROUTINE HEALING: ICD-10-CM

## 2024-11-07 DIAGNOSIS — S52.502D CLOSED FRACTURE OF DISTAL END OF LEFT RADIUS WITH ROUTINE HEALING, UNSPECIFIED FRACTURE MORPHOLOGY, SUBSEQUENT ENCOUNTER: ICD-10-CM

## 2024-11-07 DIAGNOSIS — S52.502D CLOSED FRACTURE OF DISTAL END OF LEFT RADIUS WITH ROUTINE HEALING, UNSPECIFIED FRACTURE MORPHOLOGY, SUBSEQUENT ENCOUNTER: Primary | ICD-10-CM

## 2024-11-07 PROCEDURE — 73110 X-RAY EXAM OF WRIST: CPT | Mod: LT,,, | Performed by: PHYSICIAN ASSISTANT

## 2024-11-07 PROCEDURE — 99024 POSTOP FOLLOW-UP VISIT: CPT | Mod: POP,,, | Performed by: PHYSICIAN ASSISTANT

## 2024-11-07 PROCEDURE — 73610 X-RAY EXAM OF ANKLE: CPT | Mod: RT,,, | Performed by: PHYSICIAN ASSISTANT

## 2024-11-07 RX ORDER — HYDROCODONE BITARTRATE AND ACETAMINOPHEN 5; 325 MG/1; MG/1
1 TABLET ORAL EVERY 6 HOURS PRN
Qty: 15 TABLET | Refills: 0 | Status: SHIPPED | OUTPATIENT
Start: 2024-11-07

## 2024-11-07 RX ORDER — METHOCARBAMOL 750 MG/1
750 TABLET, FILM COATED ORAL 4 TIMES DAILY
Qty: 40 TABLET | Refills: 0 | Status: SHIPPED | OUTPATIENT
Start: 2024-11-07 | End: 2024-11-17

## 2024-11-07 RX ORDER — ASPIRIN 81 MG/1
81 TABLET ORAL 2 TIMES DAILY
Qty: 120 TABLET | Refills: 0 | Status: SHIPPED | OUTPATIENT
Start: 2024-11-07 | End: 2025-01-06

## 2024-11-07 NOTE — PROGRESS NOTES
Subjective:       Patient ID: Edward Wilks is a 44 y.o. male.  Chief Complaint   Patient presents with    Left Wrist - Post-op Evaluation     3.5 week f/u from ORIF left distal radius fx. ORIF right medial mall fx. Admits to  in boot. Sutures and staples removed in rehab facility        HPI  History of Present Illness    HPI:  Mr. Wilks is presenting for a 3-week follow-up after ORIF of a left distal radius fracture and right medial malleolus ankle fracture. He admits to ambulating on the ankle to get to and from the bathroom, despite instructions not to bear weight. He reports difficulty using a walker due to narrow doorways in his home, stating that bathroom doors are 32 inches wide and cannot accommodate a walker. He mentions having had knee surgery less than a year ago for a torn meniscus, which complicates mobility.Sutures were removed in rehab prior to visit today.    Mr. Wilks describes experiencing constant, deep, achy pain in the arm that persists, along with stiffness in wrist extension and tremors in the affected arm. He reports severe pain and limited mobility upon waking. He also mentions having nightmares about the injury initially and disrupted sleep patterns since the injury. He has been using a wheelchair for most mobility.    Mr. Wilks had a stent placed in his leg over six months ago by Dr. Arias at [facility] and was briefly on Plavix at that time. He is currently taking Plavix and aspirin for blood clot prevention following recent surgeries. His last dose of hydrocodone was on the day of this visit.    Mr. Wilks denies any numbness or tingling in the fingers or foot.          ROS:  Constitutional: Denies fever chills  Eyes: No change in vision  ENT: No ringing or current infections  CV: No chest pain  Resp: No labored breathing  MSK: Pain evident at site of injury located in HPI,   Integ: No signs of abrasions or lacerations  Neuro: No numbness or tingling  Lymphatic: No  swelling outside the area of injury     Current Outpatient Medications on File Prior to Visit   Medication Sig Dispense Refill    ALBUTEROL INHL Inhale 90 mcg into the lungs As instructed. 2 puffs daily, as needed      aspirin (ECOTRIN) 81 MG EC tablet Take 1 tablet (81 mg total) by mouth once daily. 90 tablet 3    bacitracin 500 unit/gram ointment Apply topically 3 (three) times daily. Apply to laceration as needed 14 g 0    clonazePAM (KLONOPIN) 1 MG tablet Take 1 mg by mouth daily as needed for Anxiety.      clopidogreL (PLAVIX) 75 mg tablet Take 1 tablet (75 mg total) by mouth once daily. 90 tablet 3    dextroamphetamine-amphetamine (ADDERALL XR) 30 MG 24 hr capsule Take 30 mg by mouth every morning. Recently adjusted to also take 15mg short acting at the same time, taking 15mg short acting at lunch IF NEEDED      dextroamphetamine-amphetamine 10 mg Tab Take 1 tablet by mouth after lunch. (Patient taking differently: Take 1 tablet by mouth after lunch. Recently increased to 15mg with 30mg XR and 15mg at lunch if needed)      fluticasone-salmeterol diskus inhaler 100-50 mcg Inhale 1 puff into the lungs 2 (two) times daily. Controller (Patient taking differently: Inhale 1 puff into the lungs 2 (two) times daily. Controller    250-50)      hydroCHLOROthiazide (HYDRODIURIL) 12.5 MG Tab Take 12.5 mg by mouth daily as needed.      lithium (ESKALITH) 450 MG TbSR Take 900 mg by mouth every evening. 450mg every morning, 900mg every evening      loratadine (CLARITIN) 10 mg tablet Take 10 mg by mouth once daily.      prazosin (MINIPRESS) 1 MG Cap Take 2 mg by mouth every evening. Increased to 4mg      sildenafiL (VIAGRA) 50 MG tablet Take 50 mg by mouth daily as needed for Erectile Dysfunction.      [DISCONTINUED] HYDROcodone-acetaminophen (NORCO) 5-325 mg per tablet Take 1 tablet by mouth every 6 (six) hours as needed for Pain. 15 tablet 0    gabapentin (NEURONTIN) 300 MG capsule Take 300 mg by mouth 3 (three) times  "daily. (Patient not taking: Reported on 11/7/2024)      vitamin D (VITAMIN D3) 1000 units Tab Take 1 tablet by mouth once daily. (Patient not taking: Reported on 11/7/2024)       Current Facility-Administered Medications on File Prior to Visit   Medication Dose Route Frequency Provider Last Rate Last Admin    diazePAM tablet 10 mg  10 mg Oral On Call Procedure Patsy Rodriguez MD   10 mg at 05/06/24 0935    diphenhydrAMINE capsule 50 mg  50 mg Oral On Call Procedure Patsy Rodriguez MD   50 mg at 05/06/24 0935          Objective:      /83   Pulse 103   Ht 5' 10" (1.778 m)   Wt 105 kg (231 lb 7.7 oz)   BMI 33.21 kg/m²   Physical Exam  General the patient is alert and oriented x3 no acute distress nontoxic-appearing appropriate affect.    Constitutional: Vital signs are examined and stable.  Resp: No signs of labored breathing      LUE: --Skin: Incisions healing well without erythema, drainage,signs of dehiscence           -MSK: STR 5/5 AIN/PIN/Median/Radial/Ulnar motor, Stiffness with extension of the wrist, able to flex to near neutral. Velcro brace           -Neuro:  Sensation intact to light touch C5-T1 dermatomes           -Lymphatic: No signs of lymphadenopathy, No signs of swelling,           -CV:Capillary refill is less than 2 seconds.                      RLE: -Skin:  incisions healing well without erythema,drainage signs of dehiscence           -MSK: : Hip and Knee F/E, EHL/FHL, Gastroc/Tib anterior Strength 5/5           -Neuro:  Sensation intact to light touch L3-S1 dermatomes           -Lymphatic: No signs of lymphadenopathy           -CV: Capillary refill is less than 2 seconds.           Body mass index is 33.21 kg/m².  Ideal body weight: 73 kg (160 lb 15 oz)  Adjusted ideal body weight: 85.8 kg (189 lb 2.5 oz)  No results found for: "HGBA1C"  Hgb   Date Value Ref Range Status   10/17/2024 12.8 (L) 14.0 - 18.0 g/dL Final   10/16/2024 13.1 (L) 14.0 - 18.0 g/dL Final     Hct   Date Value Ref " "Range Status   10/17/2024 38.1 (L) 42.0 - 52.0 % Final   10/16/2024 39.6 (L) 42.0 - 52.0 % Final     No results found for: "IRON"  No components found for: "FROLATE"  No results found for: "[id number]"  WBC   Date Value Ref Range Status   10/17/2024 14.70 (H) 4.50 - 11.50 x10(3)/mcL Final   10/17/2024 14.7 x10(3)/mcL Final   10/16/2024 15.62 (H) 4.50 - 11.50 x10(3)/mcL Final       Radiology:   3 view x ray left wrist: comminuted distal radius fracture, extraarticular. Stable alignment without displacement as compared to previous images. No consolidation.    3 view x ray right ankle: right medial malleolus fracture without displacement as compared to previous images. Stable ankle mortise. No consolidation.       Assessment:         1. Closed fracture of distal end of left radius with routine healing, unspecified fracture morphology, subsequent encounter  X-Ray Wrist Complete Left    aspirin (ECOTRIN) 81 MG EC tablet    HYDROcodone-acetaminophen (NORCO) 5-325 mg per tablet    methocarbamoL (ROBAXIN) 750 MG Tab    Ambulatory referral/consult to Physical/Occupational Therapy      2. Closed nondisp fracture of right medial malleolus  X-Ray Ankle Complete Right              Plan:         Follow up in about 6 weeks (around 12/19/2024).    Edward was seen today for post-op evaluation.    Diagnoses and all orders for this visit:    Closed fracture of distal end of left radius with routine healing, unspecified fracture morphology, subsequent encounter  -     X-Ray Wrist Complete Left; Future  -     aspirin (ECOTRIN) 81 MG EC tablet; Take 1 tablet (81 mg total) by mouth 2 (two) times a day.  -     HYDROcodone-acetaminophen (NORCO) 5-325 mg per tablet; Take 1 tablet by mouth every 6 (six) hours as needed for Pain.  -     methocarbamoL (ROBAXIN) 750 MG Tab; Take 1 tablet (750 mg total) by mouth 4 (four) times daily. for 10 days  -     Ambulatory referral/consult to Physical/Occupational Therapy; Future    Closed nondisp " fracture of right medial malleolus  -     X-Ray Ankle Complete Right; Future        -Assessment & Plan      LIFESTYLE:  - Continue moving the ankle and wrist during the day when not wearing the boot/brace.  - Wear the ankle boot when moving around and at night to prevent injury.    MEDICATIONS PRESCRIBED:  - Prescribed muscle relaxer to help with pain and sleep. Refilled pain medications today. Taper per our protocol. Okay to DC aspirin/plavix at time of weight bearing if okay with his vascular providers.  - Continue Plavix for blood clot prevention until next vascular doctor appointment.    IMAGING ORDERS:  - Order x-ray of the ankle in 5 weeks to assess healing and determine if weight bearing can begin.    REFERRALS:  - Referred to physical therapy for range of motion exercises of the wrist and ankle. Strict ROM of wrist.     RETURN TO ACTIVITY:  - No weightbearing on the ankle. Has been non adherent to his wb recommendations.. Discussed physiology of bone healing to aid in patient understanding. Highly advise, no weight on the ankle until 8 weeks post op. Brace when not doing exercises. CAM boot to keep ankle at neutral.  - Avoid pushing, pulling, or lifting with the injured arm to 8 weeks post op but can move the elbow, wrist and digits.   - No driving until out of the boot and walking unassisted or with minimal assistance (cane or crutch).    FOLLOW UP:  - Follow up in about 5-6 weeks for x-ray and to assess weightbearing status.       -ED precautions given    The above findings, diagnostics, and treatment plan were discussed with Dr. Barrientos who is in agreement with the plan of care except as stated in additional documentation.       Mendy Fang PA-C          Future Appointments   Date Time Provider Department Center   12/31/2024 10:30 AM Jonathan Barrientos DO [facility]

## 2024-11-14 DIAGNOSIS — S52.502D CLOSED FRACTURE OF DISTAL END OF LEFT RADIUS WITH ROUTINE HEALING, UNSPECIFIED FRACTURE MORPHOLOGY, SUBSEQUENT ENCOUNTER: ICD-10-CM

## 2024-11-15 RX ORDER — METHOCARBAMOL 750 MG/1
750 TABLET, FILM COATED ORAL 4 TIMES DAILY
Qty: 40 TABLET | Refills: 0 | Status: SHIPPED | OUTPATIENT
Start: 2024-11-15 | End: 2024-11-25

## 2024-11-15 RX ORDER — HYDROCODONE BITARTRATE AND ACETAMINOPHEN 5; 325 MG/1; MG/1
1 TABLET ORAL EVERY 6 HOURS PRN
Qty: 28 TABLET | Refills: 0 | Status: SHIPPED | OUTPATIENT
Start: 2024-11-15 | End: 2024-11-22

## 2024-11-25 ENCOUNTER — PATIENT MESSAGE (OUTPATIENT)
Dept: ORTHOPEDICS | Facility: CLINIC | Age: 44
End: 2024-11-25
Payer: MEDICARE

## 2024-11-29 DIAGNOSIS — S52.502D CLOSED FRACTURE OF DISTAL END OF LEFT RADIUS WITH ROUTINE HEALING, UNSPECIFIED FRACTURE MORPHOLOGY, SUBSEQUENT ENCOUNTER: ICD-10-CM

## 2024-12-02 RX ORDER — HYDROCODONE BITARTRATE AND ACETAMINOPHEN 5; 325 MG/1; MG/1
1 TABLET ORAL EVERY 8 HOURS PRN
Qty: 21 TABLET | Refills: 0 | Status: SHIPPED | OUTPATIENT
Start: 2024-12-02 | End: 2024-12-09

## 2024-12-02 RX ORDER — METHOCARBAMOL 750 MG/1
750 TABLET, FILM COATED ORAL 4 TIMES DAILY
Qty: 40 TABLET | Refills: 0 | Status: SHIPPED | OUTPATIENT
Start: 2024-12-02 | End: 2024-12-12

## 2024-12-16 DIAGNOSIS — S52.502D CLOSED FRACTURE OF DISTAL END OF LEFT RADIUS WITH ROUTINE HEALING, UNSPECIFIED FRACTURE MORPHOLOGY, SUBSEQUENT ENCOUNTER: ICD-10-CM

## 2024-12-16 RX ORDER — ASPIRIN 81 MG/1
81 TABLET ORAL 2 TIMES DAILY
Qty: 120 TABLET | Refills: 0 | Status: SHIPPED | OUTPATIENT
Start: 2024-12-16 | End: 2025-02-14

## 2024-12-16 RX ORDER — METHOCARBAMOL 750 MG/1
750 TABLET, FILM COATED ORAL 4 TIMES DAILY
Qty: 40 TABLET | Refills: 0 | Status: SHIPPED | OUTPATIENT
Start: 2024-12-16 | End: 2024-12-26

## 2024-12-16 RX ORDER — HYDROCODONE BITARTRATE AND ACETAMINOPHEN 5; 325 MG/1; MG/1
1 TABLET ORAL EVERY 8 HOURS PRN
Qty: 21 TABLET | Refills: 0 | Status: SHIPPED | OUTPATIENT
Start: 2024-12-16 | End: 2024-12-23

## 2024-12-31 ENCOUNTER — HOSPITAL ENCOUNTER (OUTPATIENT)
Dept: RADIOLOGY | Facility: CLINIC | Age: 44
Discharge: HOME | End: 2024-12-31
Attending: ORTHOPAEDIC SURGERY
Payer: MEDICARE

## 2024-12-31 ENCOUNTER — OFFICE VISIT (OUTPATIENT)
Dept: ORTHOPEDICS | Facility: CLINIC | Age: 44
End: 2024-12-31
Payer: MEDICARE

## 2024-12-31 VITALS — BODY MASS INDEX: 33.14 KG/M2 | WEIGHT: 231.5 LBS | HEIGHT: 70 IN

## 2024-12-31 DIAGNOSIS — S82.54XD CLOSED NONDISP FRACTURE OF RIGHT MEDIAL MALLEOLUS WITH ROUTINE HEALING: ICD-10-CM

## 2024-12-31 DIAGNOSIS — S52.502D CLOSED FRACTURE OF DISTAL END OF LEFT RADIUS WITH ROUTINE HEALING, UNSPECIFIED FRACTURE MORPHOLOGY, SUBSEQUENT ENCOUNTER: Primary | ICD-10-CM

## 2024-12-31 DIAGNOSIS — S52.502D CLOSED FRACTURE OF DISTAL END OF LEFT RADIUS WITH ROUTINE HEALING, UNSPECIFIED FRACTURE MORPHOLOGY, SUBSEQUENT ENCOUNTER: ICD-10-CM

## 2024-12-31 PROCEDURE — 73610 X-RAY EXAM OF ANKLE: CPT | Mod: RT,,, | Performed by: ORTHOPAEDIC SURGERY

## 2024-12-31 PROCEDURE — 99024 POSTOP FOLLOW-UP VISIT: CPT | Mod: POP,,, | Performed by: PHYSICIAN ASSISTANT

## 2024-12-31 PROCEDURE — 73110 X-RAY EXAM OF WRIST: CPT | Mod: LT,,, | Performed by: ORTHOPAEDIC SURGERY

## 2024-12-31 NOTE — PROGRESS NOTES
Subjective:       Patient ID: Edward Wilks is a 44 y.o. male.  Chief Complaint   Patient presents with    Left Wrist - Follow-up     11.5 week f/u from ORIF left distal radius fx. ORIF right medial mall fx. Remains NWB in Boot. Reports difficulty with ROM to LUE. Currently in physical therapy.         HPI  History of Present Illness    HPI:  Mr. Wilks is presenting for 11.5-week follow-up after ORIF of a left distal radius fracture and right medial malleolus ankle fracture. He has been non weight bearing and using a wheelchair. States he had some trouble using the platform walker. Admits to using resistance bands to the left wrist for strengthening over the last few weeks. Has not been using the wrist brace. No complaints regarding the ankle today. Mild pain in the rist and the ulna and in the joint itself, able to supinate and pronate without pain. The pain in the arm has not improved much since the last visit. He has not been able to start therapy as [facility] is [facility] for [facility].          ROS:  Constitutional: Denies fever chills  Eyes: No change in vision  ENT: No ringing or current infections  CV: No chest pain  Resp: No labored breathing  MSK: Pain evident at site of injury located in HPI,   Integ: No signs of abrasions or lacerations  Neuro: No numbness or tingling  Lymphatic: No swelling outside the area of injury     Current Outpatient Medications on File Prior to Visit   Medication Sig Dispense Refill    ALBUTEROL INHL Inhale 90 mcg into the lungs As instructed. 2 puffs daily, as needed      aspirin (ECOTRIN) 81 MG EC tablet Take 1 tablet (81 mg total) by mouth once daily. 90 tablet 3    aspirin (ECOTRIN) 81 MG EC tablet Take 1 tablet (81 mg total) by mouth 2 (two) times a day. 120 tablet 0    bacitracin 500 unit/gram ointment Apply topically 3 (three) times daily. Apply to laceration as needed 14 g 0    clonazePAM (KLONOPIN) 1 MG tablet Take 1 mg by mouth daily as  "needed for Anxiety.      clopidogreL (PLAVIX) 75 mg tablet Take 1 tablet (75 mg total) by mouth once daily. 90 tablet 3    dextroamphetamine-amphetamine (ADDERALL XR) 30 MG 24 hr capsule Take 30 mg by mouth every morning. Recently adjusted to also take 15mg short acting at the same time, taking 15mg short acting at lunch IF NEEDED      dextroamphetamine-amphetamine 10 mg Tab Take 1 tablet by mouth after lunch. (Patient taking differently: Take 1 tablet by mouth after lunch. Recently increased to 15mg with 30mg XR and 15mg at lunch if needed)      fluticasone-salmeterol diskus inhaler 100-50 mcg Inhale 1 puff into the lungs 2 (two) times daily. Controller (Patient taking differently: Inhale 1 puff into the lungs 2 (two) times daily. Controller    250-50)      hydroCHLOROthiazide (HYDRODIURIL) 12.5 MG Tab Take 12.5 mg by mouth daily as needed.      lithium (ESKALITH) 450 MG TbSR Take 900 mg by mouth every evening. 450mg every morning, 900mg every evening      loratadine (CLARITIN) 10 mg tablet Take 10 mg by mouth once daily.      prazosin (MINIPRESS) 1 MG Cap Take 2 mg by mouth every evening. Increased to 4mg      sildenafiL (VIAGRA) 50 MG tablet Take 50 mg by mouth daily as needed for Erectile Dysfunction.      gabapentin (NEURONTIN) 300 MG capsule Take 300 mg by mouth 3 (three) times daily. (Patient not taking: Reported on 10/11/2024)      vitamin D (VITAMIN D3) 1000 units Tab Take 1 tablet by mouth once daily. (Patient not taking: Reported on 12/31/2024)       Current Facility-Administered Medications on File Prior to Visit   Medication Dose Route Frequency Provider Last Rate Last Admin    diazePAM tablet 10 mg  10 mg Oral On Call Procedure Patsy Rodriguez MD   10 mg at 05/06/24 0935    diphenhydrAMINE capsule 50 mg  50 mg Oral On Call Procedure Patsy Rodriguez MD   50 mg at 05/06/24 0935          Objective:      Ht 5' 10" (1.778 m)   Wt 105 kg (231 lb 7.7 oz)   BMI 33.21 kg/m²   Physical Exam  General the " "patient is alert and oriented x3 no acute distress nontoxic-appearing appropriate affect.    Constitutional: Vital signs are examined and stable.  Resp: No signs of labored breathing      LUE: --Skin: Incisions are well healed and matured           -MSK: STR 5/5 AIN/PIN/Median/Radial/Ulnar motor, Stiffness with extension of the wrist, able to flex to near neutral as well as supinate and pronate. Mild TTP over the radiocarpal joints Velcro brace           -Neuro:  Sensation intact to light touch C5-T1 dermatomes           -Lymphatic: No signs of lymphadenopathy, No signs of swelling,           -CV:Capillary refill is less than 2 seconds.                      RLE: -Skin:  incisions well healed and matured           -MSK: : Hip and Knee F/E, EHL/FHL, Gastroc/Tib anterior Strength 5/5           -Neuro:  Sensation intact to light touch L3-S1 dermatomes           -Lymphatic: No signs of lymphadenopathy           -CV: Capillary refill is less than 2 seconds.           Body mass index is 33.21 kg/m².  Ideal body weight: 73 kg (160 lb 15 oz)  Adjusted ideal body weight: 85.8 kg (189 lb 2.5 oz)  No results found for: "HGBA1C"  Hgb   Date Value Ref Range Status   10/17/2024 12.8 (L) 14.0 - 18.0 g/dL Final   10/16/2024 13.1 (L) 14.0 - 18.0 g/dL Final     Hct   Date Value Ref Range Status   10/17/2024 38.1 (L) 42.0 - 52.0 % Final   10/16/2024 39.6 (L) 42.0 - 52.0 % Final     No results found for: "IRON"  No components found for: "FROLATE"  No results found for: "[id number]"  WBC   Date Value Ref Range Status   10/17/2024 14.70 (H) 4.50 - 11.50 x10(3)/mcL Final   10/17/2024 14.7 x10(3)/mcL Final   10/16/2024 15.62 (H) 4.50 - 11.50 x10(3)/mcL Final       Radiology:   3 view x ray left wrist: comminuted distal radius fracture, extraarticular. Stable alignment without displacement as compared to previous images. Continued consolidation. One fracture fragment remains visible. No volar or dorsal angulation    3 view x ray right " ankle: right medial malleolus fracture without displacement as compared to previous images. Continued consolidation as compared to previous images. No widening of the ankle mortise.      Assessment:         1. Closed fracture of distal end of left radius with routine healing, unspecified fracture morphology, subsequent encounter  X-Ray Wrist Complete Left    Ambulatory referral/consult to Physical/Occupational Therapy      2. Closed nondisp fracture of right medial malleolus with routine healing  X-Ray Ankle Complete Right    Ambulatory referral/consult to Physical/Occupational Therapy              Plan:         No follow-ups on file.    Edward was seen today for follow-up.    Diagnoses and all orders for this visit:    Closed fracture of distal end of left radius with routine healing, unspecified fracture morphology, subsequent encounter  -     X-Ray Wrist Complete Left; Future  -     Ambulatory referral/consult to Physical/Occupational Therapy; Future    Closed nondisp fracture of right medial malleolus with routine healing  -     X-Ray Ankle Complete Right; Future  -     Ambulatory referral/consult to Physical/Occupational Therapy; Future        -Assessment & Plan    PT/OT orders updated today. WBAT and ROMAT. Transition out of boot after he has progressed to full weight bearing.   - Over the counter medications as needed for pain. No numbness or tingling distally to the LUE.   - Discussed we are happy to sign any paperwork needed by the [facility] to aid in him getting set up with therapy as this could delay his progress.   -Follow up in 6-8 weeks.        -ED precautions given    The above findings, diagnostics, and treatment plan were discussed with Dr. Barrientos who is in agreement with the plan of care except as stated in additional documentation.       Mendy aFng PA-C          Future Appointments   Date Time Provider Department [place]   2/11/2025 10:15 AM Jonathan Barrientos DO [facility] [place]

## 2025-01-03 ENCOUNTER — PATIENT MESSAGE (OUTPATIENT)
Dept: ORTHOPEDICS | Facility: CLINIC | Age: 45
End: 2025-01-03
Payer: MEDICARE

## 2025-01-03 DIAGNOSIS — S52.502D CLOSED FRACTURE OF DISTAL END OF LEFT RADIUS WITH ROUTINE HEALING, UNSPECIFIED FRACTURE MORPHOLOGY, SUBSEQUENT ENCOUNTER: ICD-10-CM

## 2025-01-06 RX ORDER — METHOCARBAMOL 750 MG/1
750 TABLET, FILM COATED ORAL 4 TIMES DAILY
Qty: 40 TABLET | Refills: 0 | Status: SHIPPED | OUTPATIENT
Start: 2025-01-06 | End: 2025-01-16

## 2025-01-06 RX ORDER — HYDROCODONE BITARTRATE AND ACETAMINOPHEN 5; 325 MG/1; MG/1
1 TABLET ORAL
Qty: 7 TABLET | Refills: 0 | Status: SHIPPED | OUTPATIENT
Start: 2025-01-06 | End: 2025-01-13

## 2025-01-13 ENCOUNTER — PATIENT MESSAGE (OUTPATIENT)
Dept: ORTHOPEDICS | Facility: CLINIC | Age: 45
End: 2025-01-13
Payer: MEDICARE

## 2025-02-11 ENCOUNTER — HOSPITAL ENCOUNTER (OUTPATIENT)
Dept: RADIOLOGY | Facility: CLINIC | Age: 45
Discharge: HOME | End: 2025-02-11
Attending: ORTHOPAEDIC SURGERY
Payer: MEDICARE

## 2025-02-11 ENCOUNTER — OFFICE VISIT (OUTPATIENT)
Dept: ORTHOPEDICS | Facility: CLINIC | Age: 45
End: 2025-02-11
Payer: MEDICARE

## 2025-02-11 VITALS
SYSTOLIC BLOOD PRESSURE: 140 MMHG | HEART RATE: 90 BPM | BODY MASS INDEX: 33.14 KG/M2 | DIASTOLIC BLOOD PRESSURE: 102 MMHG | HEIGHT: 70 IN | WEIGHT: 231.5 LBS

## 2025-02-11 DIAGNOSIS — S52.502D CLOSED FRACTURE OF DISTAL END OF LEFT RADIUS WITH ROUTINE HEALING, UNSPECIFIED FRACTURE MORPHOLOGY, SUBSEQUENT ENCOUNTER: Primary | ICD-10-CM

## 2025-02-11 DIAGNOSIS — S82.54XD CLOSED NONDISP FRACTURE OF RIGHT MEDIAL MALLEOLUS WITH ROUTINE HEALING: ICD-10-CM

## 2025-02-11 DIAGNOSIS — S52.502D CLOSED FRACTURE OF DISTAL END OF LEFT RADIUS WITH ROUTINE HEALING, UNSPECIFIED FRACTURE MORPHOLOGY, SUBSEQUENT ENCOUNTER: ICD-10-CM

## 2025-02-11 PROCEDURE — 73110 X-RAY EXAM OF WRIST: CPT | Mod: LT,,, | Performed by: ORTHOPAEDIC SURGERY

## 2025-02-11 PROCEDURE — 73610 X-RAY EXAM OF ANKLE: CPT | Mod: RT,,, | Performed by: ORTHOPAEDIC SURGERY

## 2025-02-11 PROCEDURE — 99213 OFFICE O/P EST LOW 20 MIN: CPT | Mod: ,,, | Performed by: ORTHOPAEDIC SURGERY

## 2025-02-12 NOTE — PROGRESS NOTES
Subjective:       Patient ID: Edward Wilks is a 44 y.o. male.  Chief Complaint   Patient presents with    Right Ankle - Follow-up     4 month f/u from ORIF left distal radius fx, ORIF right medial mall ankle fx. Ambulates without assistive devices. Reports constant soreness. Currently working with physical therapy.         Follow-up      History of Present Illness    HPI:  Mr. Wilks is presenting for 4 month follow-up after ORIF of a left distal radius fracture and right medial malleolus ankle fracture. He has progressed WB. Presents in regular shoes today. Has good range of motion overall of the wrist. Continues to work with physical therapy. Biggest complaints today regarding the ankle with pain in the joint as well as laterally. He had a medial malleolus fracture which was fixed surgically. BP elevated today. Asymptomatic. No chest pain, SOB, HA, vision Changes.          ROS:  Constitutional: Denies fever chills  Eyes: No change in vision  ENT: No ringing or current infections  CV: No chest pain  Resp: No labored breathing  MSK: Pain evident at site of injury located in HPI,   Integ: No signs of abrasions or lacerations  Neuro: No numbness or tingling  Lymphatic: No swelling outside the area of injury     Current Outpatient Medications on File Prior to Visit   Medication Sig Dispense Refill    ALBUTEROL INHL Inhale 90 mcg into the lungs As instructed. 2 puffs daily, as needed      aspirin (ECOTRIN) 81 MG EC tablet Take 1 tablet (81 mg total) by mouth once daily. 90 tablet 3    aspirin (ECOTRIN) 81 MG EC tablet Take 1 tablet (81 mg total) by mouth 2 (two) times a day. 120 tablet 0    bacitracin 500 unit/gram ointment Apply topically 3 (three) times daily. Apply to laceration as needed 14 g 0    clopidogreL (PLAVIX) 75 mg tablet Take 1 tablet (75 mg total) by mouth once daily. 90 tablet 3    dextroamphetamine-amphetamine 10 mg Tab Take 1 tablet by mouth after lunch. (Patient taking differently: Take 1  "tablet by mouth after lunch. Recently increased to 15mg with 30mg XR and 15mg at lunch if needed)      fluticasone-salmeterol diskus inhaler 100-50 mcg Inhale 1 puff into the lungs 2 (two) times daily. Controller (Patient taking differently: Inhale 1 puff into the lungs 2 (two) times daily. Controller    250-50)      hydroCHLOROthiazide (HYDRODIURIL) 12.5 MG Tab Take 12.5 mg by mouth daily as needed.      lithium (ESKALITH) 450 MG TbSR Take 900 mg by mouth every evening. 450mg every morning, 900mg every evening      loratadine (CLARITIN) 10 mg tablet Take 10 mg by mouth once daily.      prazosin (MINIPRESS) 1 MG Cap Take 2 mg by mouth every evening. Increased to 4mg      sildenafiL (VIAGRA) 50 MG tablet Take 50 mg by mouth daily as needed for Erectile Dysfunction.      clonazePAM (KLONOPIN) 1 MG tablet Take 1 mg by mouth daily as needed for Anxiety. (Patient not taking: Reported on 2/11/2025)      dextroamphetamine-amphetamine (ADDERALL XR) 30 MG 24 hr capsule Take 30 mg by mouth every morning. Recently adjusted to also take 15mg short acting at the same time, taking 15mg short acting at lunch IF NEEDED (Patient not taking: Reported on 2/11/2025)      gabapentin (NEURONTIN) 300 MG capsule Take 300 mg by mouth 3 (three) times daily. (Patient not taking: Reported on 2/11/2025)      vitamin D (VITAMIN D3) 1000 units Tab Take 1 tablet by mouth once daily. (Patient not taking: Reported on 11/7/2024)       Current Facility-Administered Medications on File Prior to Visit   Medication Dose Route Frequency Provider Last Rate Last Admin    diazePAM tablet 10 mg  10 mg Oral On Call Procedure Patsy Rodriguez MD   10 mg at 05/06/24 0935    diphenhydrAMINE capsule 50 mg  50 mg Oral On Call Procedure Patsy Rodriguez MD   50 mg at 05/06/24 0935          Objective:      BP (!) 140/102   Pulse 90   Ht 5' 10" (1.778 m)   Wt 105 kg (231 lb 7.7 oz)   BMI 33.21 kg/m²   Physical Exam  General the patient is alert and oriented x3 no " "acute distress nontoxic-appearing appropriate affect.    Constitutional: Vital signs are examined and stable.  Resp: No signs of labored breathing      LUE: --Skin: Incisions are well healed and matured           -MSK: STR 5/5 AIN/PIN/Median/Radial/Ulnar motor, improved ROM as compared to previous exam. Full supination and pronation. Appears full flexion, mild stiffness with extension.           -Neuro:  Sensation intact to light touch C5-T1 dermatomes           -Lymphatic: No signs of lymphadenopathy, No signs of swelling,           -CV:Capillary refill is less than 2 seconds.                      RLE: -Skin:  incisions well healed and matured           -MSK: : Hip and Knee F/E, EHL/FHL, Gastroc/Tib anterior Strength 5/5           -Neuro:  Sensation intact to light touch L3-S1 dermatomes           -Lymphatic: No signs of lymphadenopathy           -CV: Capillary refill is less than 2 seconds.           Body mass index is 33.21 kg/m².  Ideal body weight: 73 kg (160 lb 15 oz)  Adjusted ideal body weight: 85.8 kg (189 lb 2.5 oz)  No results found for: "HGBA1C"  Hgb   Date Value Ref Range Status   10/17/2024 12.8 (L) 14.0 - 18.0 g/dL Final   10/16/2024 13.1 (L) 14.0 - 18.0 g/dL Final     Hct   Date Value Ref Range Status   10/17/2024 38.1 (L) 42.0 - 52.0 % Final   10/16/2024 39.6 (L) 42.0 - 52.0 % Final     No results found for: "IRON"  No components found for: "FROLATE"  No results found for: "DOTSOGDS07HQ"  WBC   Date Value Ref Range Status   10/17/2024 14.70 (H) 4.50 - 11.50 x10(3)/mcL Final   10/17/2024 14.7 x10(3)/mcL Final   10/16/2024 15.62 (H) 4.50 - 11.50 x10(3)/mcL Final       Radiology:   3 view x ray left wrist: comminuted distal radius fracture, extraarticular. Stable alignment without displacement as compared to previous images. Continued consolidation. Consistent with routine healing. It appears to have gone on to full union.    3 view x ray right ankle: right medial malleolus fracture without " displacement as compared to previous images. Continued consolidation consistent with routine healing.  No widening of the ankle mortise.      Assessment:         1. Closed fracture of distal end of left radius with routine healing, unspecified fracture morphology, subsequent encounter  X-Ray Wrist Complete Left      2. Closed nondisp fracture of right medial malleolus with routine healing  X-Ray Ankle Complete Right              Plan:         No follow-ups on file.    Edward was seen today for follow-up.    Diagnoses and all orders for this visit:    Closed fracture of distal end of left radius with routine healing, unspecified fracture morphology, subsequent encounter  -     X-Ray Wrist Complete Left; Future    Closed nondisp fracture of right medial malleolus with routine healing  -     X-Ray Ankle Complete Right; Future        -Assessment & Plan    PT/OT orders updated today. WBAT and ROMAT. Continue therapy. Continue WBAT to the RLE and LUE. Lace up ankle brace to provide some transitional support now that he is out of boot and in regular shoes.   - Over the counter medications as needed for pain at this time. I am happy with his progress. Discussed long term swelling of distal extremities following surgery 1-2 years worse with activity and should see gradual improvement over time. Discussed compression socks and therapy to help with this when doing activity.   -Follow up PRN or in 2-3 months if he has any concerns.      -ED precautions given    Blake Saul, DO [facility]   Orthopedic Trauma          No future appointments.

## (undated) DEVICE — SUT ETHILON 3-0 FS-1 30

## (undated) DEVICE — GLOVE PROTEXIS HYDROGEL SZ6

## (undated) DEVICE — CATH ANGIO OMNI W/10SH 5F .035

## (undated) DEVICE — BLLN ATLAS GOLD 16 X 60

## (undated) DEVICE — GLOVE PROTEXIS NEU-THERA SZ6

## (undated) DEVICE — Device

## (undated) DEVICE — CANNULA NASAL ADULT

## (undated) DEVICE — APPLICATOR CHLORAPREP ORN 26ML

## (undated) DEVICE — GLOVE PROTEXIS HYDROGEL SZ9

## (undated) DEVICE — DRAPE HAND STERILE

## (undated) DEVICE — SET ANGIO ACIST CVI ANGIOTOUCH

## (undated) DEVICE — SYS WASTE FLD DISPOSAL 1400ML

## (undated) DEVICE — SPONGE GAUZE 4X4 12 PLY STRL

## (undated) DEVICE — DRESSING TEGADERM 4.4X5IN

## (undated) DEVICE — GLOVE 9.0 PROTEXIS PI BLUE

## (undated) DEVICE — CATH VISIONS PV IVUS .035

## (undated) DEVICE — SHEATH HEMOSTASIS 10FR 12CM

## (undated) DEVICE — DRAPE STERI U-SHAPED 47X51IN

## (undated) DEVICE — SCREW T10 SHAFT DRIVER N CANN

## (undated) DEVICE — DRESSING XEROFORM 5X9IN

## (undated) DEVICE — DRIVER SQUARE TIP 2.0MM

## (undated) DEVICE — SHEATH PINNACLE 8FR

## (undated) DEVICE — DRILL SURG GEMINUS 40X2.5MM

## (undated) DEVICE — SUT CTD VICRYL CT-1 27

## (undated) DEVICE — DRILL SURG GEMINUS 40X2MM

## (undated) DEVICE — GUIDEWIRE STF .035X260CM ANG